# Patient Record
Sex: MALE | NOT HISPANIC OR LATINO | Employment: FULL TIME | URBAN - METROPOLITAN AREA
[De-identification: names, ages, dates, MRNs, and addresses within clinical notes are randomized per-mention and may not be internally consistent; named-entity substitution may affect disease eponyms.]

---

## 2017-01-31 ENCOUNTER — TELEPHONE (OUTPATIENT)
Dept: FAMILY MEDICINE | Facility: CLINIC | Age: 52
End: 2017-01-31

## 2017-02-06 ENCOUNTER — OFFICE VISIT (OUTPATIENT)
Dept: FAMILY MEDICINE | Facility: CLINIC | Age: 52
End: 2017-02-06

## 2017-02-06 VITALS
TEMPERATURE: 97.9 F | BODY MASS INDEX: 26.31 KG/M2 | HEART RATE: 67 BPM | DIASTOLIC BLOOD PRESSURE: 65 MMHG | WEIGHT: 173.6 LBS | SYSTOLIC BLOOD PRESSURE: 107 MMHG | HEIGHT: 68 IN

## 2017-02-06 DIAGNOSIS — Z23 NEED FOR VACCINATION: ICD-10-CM

## 2017-02-06 DIAGNOSIS — Z71.84 TRAVEL ADVICE ENCOUNTER: Primary | ICD-10-CM

## 2017-02-06 RX ORDER — LOPERAMIDE HYDROCHLORIDE 2 MG/1
TABLET ORAL
Qty: 30 TABLET | Refills: 0 | Status: SHIPPED | OUTPATIENT
Start: 2017-02-06 | End: 2018-10-24

## 2017-02-06 RX ORDER — ATOVAQUONE AND PROGUANIL HYDROCHLORIDE 250; 100 MG/1; MG/1
1 TABLET, FILM COATED ORAL DAILY
Qty: 26 TABLET | Refills: 0 | Status: SHIPPED | OUTPATIENT
Start: 2017-02-06 | End: 2018-10-24

## 2017-02-06 RX ORDER — AZITHROMYCIN 250 MG/1
500 TABLET, FILM COATED ORAL DAILY
Qty: 6 TABLET | Refills: 0 | Status: SHIPPED | OUTPATIENT
Start: 2017-02-06 | End: 2017-02-09

## 2017-02-06 NOTE — PROGRESS NOTES
[facility] Travel Visit         Jack Mello is a 52 year old male who presents for a pre-travel assessment visit.  He will be traveling to:    Destination(s):  SUBJECTIVE:  Patient is traveling to [place] and [facility].  He'll be leaving on 01/25/17 and returning on 03/11/17.   Patient will be visiting rural areas and he'll be staying and eating with local families.  Those families have farms, so he'll be directly in contact with some animals.     Patient wasn't born and raised in the [place], but he attended college in [place] He believes that when he went to the [facility], all of his immunizations were up-to-date (1999).  He doesn't remember the dates of the immunizations, but he believes that he got at least one MMR at that time.  Otherwise, he isn't sure.     The patient has never had bad reactions from immunizations.  He doesn't work or live by anyone who has immune deficiency.  No family history of such.  He hasn't had recent immunizations.  He has no known allergies.  He completed our general medical questionnaire with everything being negative, with the exception of some jaundice at birth.     His previous travel included [place], [place], [place], and [place].  He had an episode of traveler's diarrhea when in [place], but no other travel-related illnesses.  He hasn't had reactions to travel medications or immunizations previously.  He doesn't smoke or drink alcohol.  He doesn't use IV drugs.  He doesn't have evacuation insurance and the importance of obtaining this was discussed with him and his wife. We also discussed the acquisition of multidrug resistant bacteria with [place] to [place], traveler's diarrhea, and antibiotic treatment for TD        Jack Mello has completed the travel questionnaire and it is reviewed: YES  Are there special circumstances which place this traveler at higher risk (List if 'yes')?: NO       Past Medical History   Diagnosis Date      Anxiety 2009     associated with mom's passing away         Current Outpatient Prescriptions on File Prior to Visit:  Carboxymeth-Glycerin-Polysorb 0.5-1-0.5 % SOLN Apply 1 drop to eye as needed   Ascorbic Acid (VITAMIN C PO) Take 500 mg by mouth daily   multivitamin, therapeutic with minerals (MULTI-VITAMIN) TABS Take 1 tablet by mouth daily   cetirizine (ZYRTEC) 10 MG tablet Take 10 mg by mouth daily   diltiazem 2% in PLO cream, FV COMPOUNDED, 2% GEL To anal opening three times daily.  Use a pea-sized amount.  Store at room temperature.     No current facility-administered medications on file prior to visit.      No Known Allergies    Immunizations, travel specific:  -- Yellow fever: Not Required, Not Recommended  -- Hep A: Immunity status unknown  -- Hep B: Immunity status unknown  -- Typhoid (IM: booster every 2 years; PO: booster every 5 years): Immunity status unknown  -- Rabies  (Data on the need for and timing of additional booster doses are not available): Immunity status unknown  -- Meningococcal meningitis Immunity status unknown   -- Arabic encephalitis (Data on the need for and timing of additional booster doses are not available):Immunity status unknown    Immunizations, routine adult:  -- Influenza UTD with immunization   -- Polio: Immunity status unknown  -- Diphtheria, Tetanus & Pertussis (DTaP): Immunity status unknown  -- Measles/ Mumps/ Rubella: Immunity status unknown  -- Varicella: Patient is quite sure that he has had the disease.  His brother has also had shingles  -- Pneumococcal (PPSV23 (Pneumovax)): Immunity status unknown  -- Pneumococcal (PCV13 (Prevnar)): Immunity status unknown    Malaria prophylaxis:  Recommended (refer to [facility] for destination-specific recommendation) YES       Review of Systems:     C: NEGATIVE for fever, chills, change in weight  E/M: NEGATIVE for ear, mouth and throat problems  R: NEGATIVE for significant cough or SOB  CV: NEGATIVE for chest pain, palpitations  "or peripheral edema          Objective:     /65 mmHg  Pulse 67  Temp(Src) 97.9  F (36.6  C) (Oral)  Ht 5' 8\" (172.7 cm)  Wt 173 lb 9.6 oz (78.744 kg)  BMI 26.40 kg/m2  Body mass index is 26.4 kg/(m^2).    GENERAL: healthy, alert, well nourished, well hydrated, no distress  PSYCH: Alert and oriented times 3; speech- coherent , normal rate and volume; able to articulate logical thoughts, able to abstract reason, no tangential thoughts, affect- normal         Assessment and Plan     Based on patient's past history, review of immunization and immunity status, planned itinerary and current recommendations, the following are recommended:      Travel advice encounter  -     loperamide (IMODIUM A-D) 2 MG tablet; Take 2 tabs (4 mg) after first loose stool, and then take one tab (2 mg) after each diarrheal stool.  Max of 8 tabs (16 mg) per day.  -     atovaquone-proguanil (MALARONE) 250-100 MG per tablet; Take 1 tablet by mouth daily Start 2 days before travel and continue 7 days after return.  -     azithromycin (ZITHROMAX) 250 MG tablet; Take 2 tablets (500 mg) by mouth daily for 3 days  -     ADMIN VACCINE, EACH ADDITIONAL  -     ADMIN VACCINE, INITIAL  -     MMR VIRUS IMMUNIZATION, SUBCUT  -     HEPATITIS A VACCINE ADULT IM  -     TYPHOID VACCINE, IM  -     TDAP (BOOSTRIX AGES 10 and older)    Need for vaccination  -     ADMIN VACCINE, EACH ADDITIONAL  -     ADMIN VACCINE, INITIAL  -     MMR VIRUS IMMUNIZATION, SUBCUT  -     HEPATITIS A VACCINE ADULT IM  -     TYPHOID VACCINE, IM  -     TDAP (BOOSTRIX AGES 10 and older)  -     ADMIN VACCINE, EACH ADDITIONAL  -     ADMIN VACCINE, INITIAL  -     MMR VIRUS IMMUNIZATION, SUBCUT  -     HEPATITIS A VACCINE ADULT IM  -     TYPHOID VACCINE, IM  -     TDAP (BOOSTRIX AGES 10 and older)    Patient is given a copy of the [facility] traveller report as well as destination-specific recommendations on sun protection, avoiding insect bites and travel safety.    Options for " treatment and/or follow-up care were reviewed with the patient. Jack Mello was engaged and actively involved in the decision making process. He verbalized understanding of the options discussed and was satisfied with the final plan.      Herman Blood MD

## 2018-03-24 ENCOUNTER — TRANSFERRED RECORDS (OUTPATIENT)
Dept: HEALTH INFORMATION MANAGEMENT | Facility: CLINIC | Age: 53
End: 2018-03-24

## 2018-09-11 ENCOUNTER — OFFICE VISIT (OUTPATIENT)
Dept: OPHTHALMOLOGY | Facility: CLINIC | Age: 53
End: 2018-09-11
Attending: OPHTHALMOLOGY
Payer: COMMERCIAL

## 2018-09-11 DIAGNOSIS — Z98.890 HISTORY OF PHOTOREFRACTIVE KERATECTOMY: Primary | ICD-10-CM

## 2018-09-11 DIAGNOSIS — H52.222 REGULAR ASTIGMATISM OF LEFT EYE: ICD-10-CM

## 2018-09-11 PROCEDURE — G0463 HOSPITAL OUTPT CLINIC VISIT: HCPCS | Mod: 25,ZF

## 2018-09-11 PROCEDURE — 92015 DETERMINE REFRACTIVE STATE: CPT | Mod: 52,ZF

## 2018-09-11 ASSESSMENT — CUP TO DISC RATIO
OD_RATIO: 0.2
OS_RATIO: 0.2

## 2018-09-11 ASSESSMENT — EXTERNAL EXAM - RIGHT EYE: OD_EXAM: NORMAL

## 2018-09-11 ASSESSMENT — VISUAL ACUITY
OD_SC: 20/15
OD_SC+: -1
OD_SC: J3
OS_SC: J3
OS_SC: 20/25
OS_SC+: -2
METHOD: SNELLEN - LINEAR

## 2018-09-11 ASSESSMENT — TONOMETRY
IOP_METHOD: TONOPEN
OS_IOP_MMHG: 14
OD_IOP_MMHG: 12

## 2018-09-11 ASSESSMENT — CONF VISUAL FIELD
METHOD: COUNTING FINGERS
OD_NORMAL: 1
OS_NORMAL: 1

## 2018-09-11 ASSESSMENT — REFRACTION_MANIFEST
OD_CYLINDER: SPHERE
OD_ADD: +2.25
OS_CYLINDER: +1.50
OS_SPHERE: PLANO
OD_SPHERE: PLANO
OS_ADD: +2.25
OS_AXIS: 091

## 2018-09-11 ASSESSMENT — EXTERNAL EXAM - LEFT EYE: OS_EXAM: NORMAL

## 2018-09-11 ASSESSMENT — SLIT LAMP EXAM - LIDS
COMMENTS: NORMAL
COMMENTS: NORMAL

## 2018-09-11 NOTE — MR AVS SNAPSHOT
After Visit Summary   9/11/2018    Jack Mello    MRN: [id number]           Patient Information     Date Of Birth          1965        Visit Information        Provider Department      9/11/2018 2:30 PM Luis Daniel Walton MD [facility]        Today's Diagnoses     History of photorefractive keratectomy    -  1    Regular astigmatism of left eye           Follow-ups after your visit        Your next 10 appointments already scheduled     Oct 24, 2018  1:10 PM CDT   (Arrive by 12:55 PM)   New Patient Visit with Edi Sumner MD   [facility] ([facility])    [address] [place] [address]  4th [facility] [place] [address] [phone]              Who to contact     Please call your clinic at [phone] to:    Ask questions about your health    Make or cancel appointments    Discuss your medicines    Learn about your test results    Speak to your doctor            Additional Information About Your Visit        MyChart Information     [employer] gives you secure access to your electronic health record. If you see a primary care provider, you can also send messages to your care team and make appointments. If you have questions, please call your primary care clinic.  If you do not have a primary care provider, please call [phone] and they will assist you.      [employer] is an electronic gateway that provides easy, online access to your medical records. With [employer], you can request a clinic appointment, read your test results, renew a prescription or communicate with your care team.     To access your existing account, please contact your [facility] or call [phone] for assistance.        Care EveryWhere ID     This is your Care EveryWhere ID. This could be used by other organizations to access your [place] medical records  [id number]         Blood Pressure from Last 3 Encounters:   02/06/17 107/65   11/03/15 110/71    05/11/15 120/86    Weight from Last 3 Encounters:   02/06/17 78.7 kg (173 lb 9.6 oz)   11/03/15 79.5 kg (175 lb 3.2 oz)   05/11/15 80.3 kg (177 lb)              Today, you had the following     No orders found for display       Primary Care Provider Office Phone # Fax #    Edi Sumner [phone]       [facility] [address] [address]        Equal Access to Services     LATOYA [facility] Center[facility]ALGORZATA : Hadii aad ku hadasho Soomaali, waaxda luqadaha, qaybta kaalmada adeegyada, waxay idiin hayaan adelorin persaud . So [facility] [phone].    ATENCIÓN: Si habla español, tiene a farfan disposición servicios gratuitos de asistencia lingüística. [facility] [phone].    We comply with applicable federal civil rights laws and [place] laws. We do not discriminate on the basis of race, color, national origin, age, disability, sex, sexual orientation, or gender identity.            Thank you!     Thank you for choosing EYE CLINIC  for your care. Our goal is always to provide you with excellent care. Hearing back from our patients is one way we can continue to improve our services. Please take a few minutes to complete the written survey that you may receive in the mail after your visit with us. Thank you!             Your Updated Medication List - Protect others around you: Learn how to safely use, store and throw away your medicines at www.disposemymeds.org.          This list is accurate as of 9/11/18  4:17 PM.  Always use your most recent med list.                   Brand Name Dispense Instructions for use Diagnosis    atovaquone-proguanil 250-100 MG per tablet    MALARONE    26 tablet    Take 1 tablet by mouth daily Start 2 days before travel and continue 7 days after return.    Travel advice encounter       Carboxymeth-Glycerin-Polysorb 0.5-1-0.5 % Soln      Apply 1 drop to eye as needed        cetirizine 10 MG tablet    zyrTEC     Take 10 mg by mouth daily        diltiazem 2% in PLO cream (FV  COMPOUNDED) 2% Gel     60 g    To anal opening three times daily.  Use a pea-sized amount.  Store at room temperature.    Anal fissure       loperamide 2 MG tablet    IMODIUM A-D    30 tablet    Take 2 tabs (4 mg) after first loose stool, and then take one tab (2 mg) after each diarrheal stool.  Max of 8 tabs (16 mg) per day.    Travel advice encounter       Multi-vitamin Tabs tablet      Take 1 tablet by mouth daily        VITAMIN C PO      Take 500 mg by mouth daily

## 2018-09-11 NOTE — NURSING NOTE
Chief Complaints and History of Present Illnesses   Patient presents with     Follow Up For     History of photorefractive keratectomy      HPI    Last Eye Exam:  7/26/16   Affected eye(s):  Both   Symptoms:        Unknown duration    Frequency:  Constant       Do you have eye pain now?:  No      Comments:  Jack is here for a follow up of History of photorefractive keratectomy   He says vision is pretty good, but says when he wakes; his eyes are dry.     Cristo Peña COT 2:40 PM September 11, 2018

## 2018-09-11 NOTE — PROGRESS NOTES
"HPI:  51 year old male here to discuss left eye laser touch up.  Has really noticed he has been \"leaning on the right eye\" as of late.  He notices problems with focusing and depth perception.  Since the surgery he has noticed that he has had worsening of his dry eye.  Uses artificial tears.    A/P:  S/p Photorefractive keratectomy (PRK) both eyes  12/14/15   Stabilized plano right eye    Residual cylinder 1.5d left eye   Stable x 2 years    Plan enhancement Photorefractive keratectomy (PRK) w mitomycin-C left eye     Luis Downs M.D.  PGY-3, Ophthalmology       ~~~~~~~~~~~~~~~~~~~~~~~~~~~~~~~~~~~~~~~~~~~~~~~~~~~~~~~~~~~~~~~~    Complete documentation of historical and exam elements from today's encounter can be found in the full encounter summary report (not reduplicated in this progress note). I personally obtained the chief complaint(s) and history of present illness.  I confirmed and edited as necessary the review of systems, past medical/surgical history, family history, social history, and examination findings as documented by others.  I examined the patient myself, and I personally reviewed the relevant tests, images, and reports as documented above. I formulated and edited as necessary the assessment and plan and discussed the findings and management plan with the patient and family.     Luis Daniel Walton MD, MA  Director, Cornea & Anterior Segment  [facility] Department of Ophthalmology & Visual Neuroscience        "

## 2018-09-11 NOTE — Clinical Note
Patient will need Photorefractive keratectomy (PRK) enhancement left eye only at [facility] once contract up and running.  Please call him to schedule once we are able.  He's hoping to get this done before winter.

## 2018-10-24 ENCOUNTER — OFFICE VISIT (OUTPATIENT)
Dept: INTERNAL MEDICINE | Facility: CLINIC | Age: 53
End: 2018-10-24
Payer: COMMERCIAL

## 2018-10-24 ENCOUNTER — APPOINTMENT (OUTPATIENT)
Dept: LAB | Facility: CLINIC | Age: 53
End: 2018-10-24
Payer: COMMERCIAL

## 2018-10-24 VITALS
DIASTOLIC BLOOD PRESSURE: 78 MMHG | SYSTOLIC BLOOD PRESSURE: 121 MMHG | HEART RATE: 76 BPM | WEIGHT: 179 LBS | BODY MASS INDEX: 27.22 KG/M2

## 2018-10-24 DIAGNOSIS — Z00.00 ENCOUNTER FOR ROUTINE HISTORY AND PHYSICAL EXAM FOR MALE: ICD-10-CM

## 2018-10-24 DIAGNOSIS — Z23 NEED FOR PROPHYLACTIC VACCINATION AND INOCULATION AGAINST INFLUENZA: Primary | ICD-10-CM

## 2018-10-24 DIAGNOSIS — R74.8 ELEVATED LIVER ENZYMES: ICD-10-CM

## 2018-10-24 LAB
ALBUMIN SERPL-MCNC: 4.1 G/DL (ref 3.4–5)
ALBUMIN UR-MCNC: NEGATIVE MG/DL
ALP SERPL-CCNC: 87 U/L (ref 40–150)
ALT SERPL W P-5'-P-CCNC: 145 U/L (ref 0–70)
ANION GAP SERPL CALCULATED.3IONS-SCNC: 6 MMOL/L (ref 3–14)
APPEARANCE UR: CLEAR
AST SERPL W P-5'-P-CCNC: 74 U/L (ref 0–45)
BILIRUB SERPL-MCNC: 0.8 MG/DL (ref 0.2–1.3)
BILIRUB UR QL STRIP: NEGATIVE
BUN SERPL-MCNC: 13 MG/DL (ref 7–30)
CALCIUM SERPL-MCNC: 8.7 MG/DL (ref 8.5–10.1)
CHLORIDE SERPL-SCNC: 102 MMOL/L (ref 94–109)
CHOLEST SERPL-MCNC: 220 MG/DL
CO2 SERPL-SCNC: 27 MMOL/L (ref 20–32)
COLOR UR AUTO: YELLOW
CREAT SERPL-MCNC: 0.96 MG/DL (ref 0.66–1.25)
GFR SERPL CREATININE-BSD FRML MDRD: 82 ML/MIN/1.7M2
GLUCOSE SERPL-MCNC: 87 MG/DL (ref 70–99)
GLUCOSE UR STRIP-MCNC: NEGATIVE MG/DL
HDLC SERPL-MCNC: 34 MG/DL
HGB UR QL STRIP: NEGATIVE
KETONES UR STRIP-MCNC: NEGATIVE MG/DL
LDLC SERPL CALC-MCNC: 152 MG/DL
LEUKOCYTE ESTERASE UR QL STRIP: NEGATIVE
MUCOUS THREADS #/AREA URNS LPF: PRESENT /LPF
NITRATE UR QL: NEGATIVE
NONHDLC SERPL-MCNC: 186 MG/DL
PH UR STRIP: 6 PH (ref 5–7)
POTASSIUM SERPL-SCNC: 3.6 MMOL/L (ref 3.4–5.3)
PROT SERPL-MCNC: 8.2 G/DL (ref 6.8–8.8)
PSA SERPL-ACNC: 0.97 UG/L (ref 0–4)
RBC #/AREA URNS AUTO: 0 /HPF (ref 0–2)
SODIUM SERPL-SCNC: 135 MMOL/L (ref 133–144)
SOURCE: ABNORMAL
SP GR UR STRIP: 1.02 (ref 1–1.03)
TRIGL SERPL-MCNC: 168 MG/DL
TSH SERPL DL<=0.005 MIU/L-ACNC: 1.7 MU/L (ref 0.4–4)
UROBILINOGEN UR STRIP-MCNC: 0 MG/DL (ref 0–2)
WBC #/AREA URNS AUTO: 1 /HPF (ref 0–5)

## 2018-10-24 ASSESSMENT — PAIN SCALES - GENERAL: PAINLEVEL: NO PAIN (0)

## 2018-10-24 NOTE — PROGRESS NOTES
[facility]  Primary Care Center   Edi Sumner MD  10/24/2018      Chief Complaint:   Establish Care and Physical       History of Present Illness:   Jack Mello is a 53 year old male with a history of anxiety who presents alone for establishing care and a physical evaluation.    Weight: He notes he has been struggling to lose 5-10 lbs. However, he has been maintaining his weight between 168-174lbs. He notes he would like to be below 170 lbs. He previously attended yoga classes but is no longer doing this.    Cholesterol: His cholesterol was last checked four years ago on 11/6/2014 which was borderline elevated. His HDL was low and LDL was elevated at that time as well. He would like to have this rechecked today.      Anxiety: He suffered from anxiety after his mother passed away. He has a strained relationship with one of his sisters but is close with his other siblings. He recently took a family trip back to [facility] which he believed helped reduce some of his anxiety.     Congestion: He has occasional sinus congestion which he has dealt with his whole life. To resolve this he uses nasal irrigation. He notes that sometimes he has migraine symptoms associated with this congestion. In order to and will sometimes take Ibuprofen or Excedrin. His migraine symptoms are resolved within a few hours after he spends some time in a dark room.     Pain: He notes that his neck will get tight on both sides from time to time which he believes could be due to stress. He notes that he sometimes feels the tightness in his lower back as well. Of note, he has not seen massage therapist. He denies hematuria and pain with urination.      Other concerns discussed:  1. Colonoscopy completed   2. Flu vaccination  3. PSA check - strong stream, no issues   4. Moles have been checked and no changes      Review of Systems:   Pertinent items are noted in HPI, remainder of complete ROS is negative.      Active Medications:      Ascorbic Acid  (VITAMIN C PO), Take 500 mg by mouth daily, Disp: , Rfl:      atovaquone-proguanil (MALARONE) 250-100 MG per tablet, Take 1 tablet by mouth daily Start 2 days before travel and continue 7 days after return., Disp: 26 tablet, Rfl: 0     Carboxymeth-Glycerin-Polysorb 0.5-1-0.5 % SOLN, Apply 1 drop to eye as needed, Disp: , Rfl:      cetirizine (ZYRTEC) 10 MG tablet, Take 10 mg by mouth daily, Disp: , Rfl:      diltiazem 2% in PLO cream, FV COMPOUNDED, 2% GEL, To anal opening three times daily.  Use a pea-sized amount.  Store at room temperature., Disp: 60 g, Rfl: 0     loperamide (IMODIUM A-D) 2 MG tablet, Take 2 tabs (4 mg) after first loose stool, and then take one tab (2 mg) after each diarrheal stool.  Max of 8 tabs (16 mg) per day., Disp: 30 tablet, Rfl: 0     multivitamin, therapeutic with minerals (MULTI-VITAMIN) TABS, Take 1 tablet by mouth daily, Disp: , Rfl:       Allergies:   Review of patient's allergies indicates no known allergies.      Past Medical History:  Anxiety  Myopia      Past Surgical History:  Photorefractive keratectomy unilateral standard or customvue w or w/o mitomycin, right/left - 12/14/2015  [place] screening, bilateral - 12/14/2015    Family History:   Thyroid cancer - father  Coronary artery disease - mother      Social History:   The patient is  with a 9 year old son, a nonsmoker, and rarely consumes alcohol. 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      Physical Exam:   /78 (BP Location: Right arm, Patient Position: Sitting, Cuff Size: Adult Large)  Pulse 76  Wt 81.2 kg (179 lb)  BMI 27.22 kg/m2   GENERAL APPEARANCE: Healthy, alert and no distress  EYES: EOMI, PERRL  HENT: Ear canals and TM's normal. Nose and mouth without ulcers or lesions  NECK: No adenopathy, no asymmetry, masses, or scars. Thyroid normal to palpation  RESP: Lungs clear to auscultation - no rales, rhonchi or wheezes  CV: Regular rates and rhythm, normal  S1 and S2, no S3 or S4 and no murmur, click or rub  ABDOMEN:  Soft, nontender, no HSM or masses and bowel sounds normal  MS: Extremities normal - no gross deformities noted  NEURO: Normal strength and tone, mentation intact and speech normal  PSYCH: Mentation appears normal and affect normal/bright   GENITAL: normal; both testes normal without tenderness or masses, no inguinal hernia   SKIN: scattered benign  nevi on back.      Assessment and Plan:  Need for prophylactic vaccination and inoculation against influenza  - FLU VACCINE, SPLIT VIRUS, IM (QUADRIVALENT) [71642]- >3 YRS    Encounter for routine history and physical exam for male  Routine blood work due. Pending results will follow up as needed.   - Lipid Profile FASTING  - Comprehensive metabolic panel  - PSA screen  - TSH with free T4 reflex  - UA with Micro reflex to Culture     Weight  Weight is well maintained. Continue with current activity level. Advised to continue to try to lower weight set point down to 175 lbs by controlling amount and frequency of eating. Routine exercising could help with basal metabolic rate.      Hyperlipidemia   Would like to address lowering cholesterol through increased exercising and managing diet. Also advised him to continue spending time with his family and sustaining those relationships to help reduce depression and thus risk of heart attack. The ASCVD 10 year risk for heart attack is 5.7 %, would be reduced to 4.3% with statin therapy.  We discussed this and for now, he prefers to trial diet and exercise.      Asprin   Consider baby Asprin once a day to help with platelets.Could also reduce risk of colorectal cancer.       Follow-up: Data Unavailable Patient should follow up in clinic in one year or on an as needed basis.        Scribe Disclosure:   I, Graciela Kirkpatrick, am serving as a scribe to document services personally performed by Edi Sumner MD at this visit, based upon the provider's statements to me. All  documentation has been reviewed by the aforementioned provider prior to being entered into the official medical record.     Portions of this medical record were completed by a scribe. UPON MY REVIEW AND AUTHENTICATION BY ELECTRONIC SIGNATURE, this confirms (a) I performed the applicable clinical services, and (b) the record is accurate.

## 2018-10-24 NOTE — MR AVS SNAPSHOT
After Visit Summary   10/24/2018    Jack Mello    MRN: [id number]           Patient Information     Date Of Birth          1965        Visit Information        Provider Department      10/24/2018 1:10 PM Edi Sumner MD [facility]        Today's Diagnoses     Need for prophylactic vaccination and inoculation against influenza    -  1    Encounter for routine history and physical exam for male          Care Instructions    Primary Care Center Medication Refill Request Information:  * Please contact your pharmacy regarding ANY request for medication refills.  ** Psychiatric Prescription Fax = [phone]  * Please allow 3 business days for routine medication refills.  * Please allow 5 business days for controlled substance medication refills.     Primary Care Center Test Result notification information:  *You will be notified with in 7-10 days of your appointment day regarding the results of your test.  If you are on MyChart you will be notified as soon as the provider has reviewed the results and signed off on them.    [facility]: [phone]             Follow-ups after your visit        Your next 10 appointments already scheduled     Oct 24, 2018  2:15 PM CDT   LAB with  LAB   [facility] ([facility])    [address] [place] [id number] [phone]           Please do not eat 10-12 hours before your appointment if you are coming in fasting for labs on lipids, cholesterol, or glucose (sugar). This does not apply to pregnant women. Water, hot tea and black coffee (with nothing added) are okay. Do not drink other fluids, diet soda or chew gum.              Who to contact     Please call your clinic at [phone] to:    Ask questions about your health    Make or cancel appointments    Discuss your medicines    Learn about your test results    Speak to your doctor            Additional Information About Your  Visit        [employer] Information     [employer] gives you secure access to your electronic health record. If you see a primary care provider, you can also send messages to your care team and make appointments. If you have questions, please call your primary care clinic.  If you do not have a primary care provider, please call [phone] and they will assist you.      [employer] is an electronic gateway that provides easy, online access to your medical records. With [employer], you can request a clinic appointment, read your test results, renew a prescription or communicate with your care team.     To access your existing account, please contact your [facility] or call [phone] for assistance.        Care EveryWhere ID     This is your Care EveryWhere ID. This could be used by other organizations to access your [place] medical records  [id number]        Your Vitals Were     Pulse BMI (Body Mass Index)                76 27.22 kg/m2           Blood Pressure from Last 3 Encounters:   10/24/18 121/78   02/06/17 107/65   11/03/15 110/71    Weight from Last 3 Encounters:   10/24/18 81.2 kg (179 lb)   02/06/17 78.7 kg (173 lb 9.6 oz)   11/03/15 79.5 kg (175 lb 3.2 oz)              We Performed the Following     Comprehensive metabolic panel     FLU VACCINE, SPLIT VIRUS, IM (QUADRIVALENT) [68965]- >3 YRS     Lipid Profile FASTING     PSA screen     TSH with free T4 reflex     UA with Micro reflex to Culture          Today's Medication Changes          These changes are accurate as of 10/24/18  2:03 PM.  If you have any questions, ask your nurse or doctor.               Stop taking these medicines if you haven't already. Please contact your care team if you have questions.     atovaquone-proguanil 250-100 MG per tablet   Commonly known as:  MALARONE   Stopped by:  Edi Sumner MD           Carboxymeth-Glycerin-Polysorb 0.5-1-0.5 % Soln   Stopped by:  Edi Sumner MD            cetirizine 10 MG tablet   Commonly known as:  zyrTEC   Stopped by:  Edi Sumner MD           diltiazem 2% in PLO cream (FV COMPOUNDED) 2% Gel   Stopped by:  Edi Sumner MD           loperamide 2 MG tablet   Commonly known as:  IMODIUM A-D   Stopped by:  Edi Sumner MD           VITAMIN C PO   Stopped by:  Edi Sumner MD                    Primary Care Provider Office Phone # Fax #    Edi Sunmer [phone]       [facility] [facility] [address]  [facility] [address]        Equal Access to Services     [facility]: Hadii aad ku hadasho Soomaali, waaxda luqadaha, qaybta kaalmada adeegyada, vince camacho hayjose manueln adelorin persaud . So [facility] [phone].    ATENCIÓN: Si habla español, tiene a farfan disposición servicios gratuitos de asistencia lingüística. [facility] [phone].    We comply with applicable federal civil rights laws and [place] laws. We do not discriminate on the basis of race, color, national origin, age, disability, sex, sexual orientation, or gender identity.            Thank you!     Thank you for choosing [facility]  for your care. Our goal is always to provide you with excellent care. Hearing back from our patients is one way we can continue to improve our services. Please take a few minutes to complete the written survey that you may receive in the mail after your visit with us. Thank you!             Your Updated Medication List - Protect others around you: Learn how to safely use, store and throw away your medicines at www.disposemymeds.org.          This list is accurate as of 10/24/18  2:03 PM.  Always use your most recent med list.                   Brand Name Dispense Instructions for use Diagnosis    Multi-vitamin Tabs tablet      Take 1 tablet by mouth daily

## 2018-10-24 NOTE — NURSING NOTE
Jack Mello      1.  Has the patient received the information for the influenza vaccine? YES    2.  Does the patient have any of the following contraindications?     Allergy to eggs? No     Allergic reaction to previous influenza vaccines? No     Any other problems to previous influenza vaccines? No     Paralyzed by Guillain-Wheaton syndrome? No     Currently pregnant? NO     Current moderate or severe illness? No     Allergy to contact lens solution? No    3.  The vaccine has been administered in the usual fashion and the patient was instructed to wait 20 minutes before leaving the building in the event of an allergic reaction: YES    Recorded by Sandro Maynard

## 2018-10-24 NOTE — NURSING NOTE
Chief Complaint   Patient presents with     Establish Care     here to re-establis care     Physical     needs flu shot       Sandro Maynard, EMT 1:14 PM on 10/24/2018.

## 2018-10-24 NOTE — PATIENT INSTRUCTIONS
[facility] Medication Refill Request Information:  * Please contact your pharmacy regarding ANY request for medication refills.  ** [facility] Prescription Fax = [phone]  * Please allow 3 business days for routine medication refills.  * Please allow 5 business days for controlled substance medication refills.     [facility] Test Result notification information:  *You will be notified with in 7-10 days of your appointment day regarding the results of your test.  If you are on MyChart you will be notified as soon as the provider has reviewed the results and signed off on them.    [facility]: [phone]

## 2018-12-16 ENCOUNTER — TRANSFERRED RECORDS (OUTPATIENT)
Dept: HEALTH INFORMATION MANAGEMENT | Facility: CLINIC | Age: 53
End: 2018-12-16

## 2019-01-14 ENCOUNTER — TELEPHONE (OUTPATIENT)
Dept: OPHTHALMOLOGY | Facility: CLINIC | Age: 54
End: 2019-01-14

## 2019-01-14 DIAGNOSIS — Z98.890 HISTORY OF PHOTOREFRACTIVE KERATECTOMY: Primary | ICD-10-CM

## 2019-01-14 NOTE — TELEPHONE ENCOUNTER
Returned patient call to schedule procedure with Dr. Walton. Advised I will need to get orders and call him back.

## 2019-01-17 ENCOUNTER — TELEPHONE (OUTPATIENT)
Dept: OPHTHALMOLOGY | Facility: CLINIC | Age: 54
End: 2019-01-17

## 2019-01-17 NOTE — TELEPHONE ENCOUNTER
Called patient to schedule procedure with Dr. Walton, there was no answer. Left message with my direct line [phone].

## 2019-01-22 ENCOUNTER — TELEPHONE (OUTPATIENT)
Dept: OPHTHALMOLOGY | Facility: CLINIC | Age: 54
End: 2019-01-22

## 2019-01-22 NOTE — TELEPHONE ENCOUNTER
Called patient to schedule procedure with Dr. Walton, there was no answer. Left message with my direct line [phone].

## 2019-01-29 ENCOUNTER — TELEPHONE (OUTPATIENT)
Dept: OPHTHALMOLOGY | Facility: CLINIC | Age: 54
End: 2019-01-29

## 2019-01-29 NOTE — TELEPHONE ENCOUNTER
Patient is scheduled for surgery with Dr. Walton      Spoke or left message with: patient    Date of Surgery: 2/6/19    Location: [facility]    Informed patient they will need an adult  yes    Pre-op with surgeon (if applicable): JONATAN    H&P: Scheduled with JONATAN topical    Additional imaging/appointments: JONATAN    Surgery packet: mailed 1/29/19    Additional comments: Advised need adult surgery day and 1 day post op. Advised financial counselor will call with pricing for procedure.

## 2019-01-29 NOTE — TELEPHONE ENCOUNTER
Called patient to schedule procedure with Dr. Walton, there was no answer. Left message with my direct line [phone].

## 2019-02-04 ENCOUNTER — TELEPHONE (OUTPATIENT)
Dept: OPHTHALMOLOGY | Facility: CLINIC | Age: 54
End: 2019-02-04

## 2019-02-04 NOTE — TELEPHONE ENCOUNTER
Returned patient's call to verify arrival time for procedure on 2/6/19 with Dr. Walton. Advised arrival time is 7: 15 am surgery time 8: 15 am.

## 2019-02-06 ENCOUNTER — TRANSFERRED RECORDS (OUTPATIENT)
Dept: HEALTH INFORMATION MANAGEMENT | Facility: CLINIC | Age: 54
End: 2019-02-06

## 2019-02-07 ENCOUNTER — OFFICE VISIT (OUTPATIENT)
Dept: OPHTHALMOLOGY | Facility: CLINIC | Age: 54
End: 2019-02-07
Attending: OPHTHALMOLOGY
Payer: COMMERCIAL

## 2019-02-07 DIAGNOSIS — Z98.890 HISTORY OF PHOTOREFRACTIVE KERATECTOMY: Primary | ICD-10-CM

## 2019-02-07 PROCEDURE — G0463 HOSPITAL OUTPT CLINIC VISIT: HCPCS | Mod: ZF

## 2019-02-07 ASSESSMENT — TONOMETRY
IOP_METHOD: ICARE
OS_IOP_MMHG: 13
OD_IOP_MMHG: 13

## 2019-02-07 ASSESSMENT — VISUAL ACUITY
OS_SC: 20/200
OS_PH_SC: 20/40
OD_SC: 20/15
METHOD: SNELLEN - LINEAR

## 2019-02-07 ASSESSMENT — EXTERNAL EXAM - LEFT EYE: OS_EXAM: NORMAL

## 2019-02-07 ASSESSMENT — EXTERNAL EXAM - RIGHT EYE: OD_EXAM: NORMAL

## 2019-02-07 ASSESSMENT — SLIT LAMP EXAM - LIDS
COMMENTS: NORMAL
COMMENTS: NORMAL

## 2019-02-07 NOTE — PROGRESS NOTES
"CC: POD1 s/p PRK with MMC OS    HPI:  51 year old male here to discuss left eye laser touch up.  Has really noticed he has been \"leaning on the right eye\" as of late.  He notices problems with focusing and depth perception.  Since the surgery he has noticed that he has had worsening of his dry eye.  Uses artificial tears.     POH:  -s/p PRK each eye 12/14/15  -s/p PRK with [facility] enhancement left eye 2/6/19    Gtts:  Ofloxacin QID left eye  pred QID left eye  PF artificial tears q1 while awake OS    A/P:  1. S/p Photorefractive keratectomy (PRK) both eyes  12/14/15   -right eye doing well - 20/15 uncorrected  -left eye: POD1 s/p PRK enhancement +[facility], left eye 2/6/19  -continue pred QID, ketorolac QID, and ofloxacin QID  -frequent preservative-free tears every 1 hour  -no eye rubbing, no water in the eye; shield at bedtime    2. Nuclear sclerosis, each eye  -minimally visually significant  -monitor    Hold MRx until heals from PRK left eye.  F/u 1 week    Krupa Valerio MD  PGY-5, Cornea Fellow  Ophthalmology        ~~~~~~~~~~~~~~~~~~~~~~~~~~~~~~~~~~~~~~~~~~~~~~~~~~~~~~~~~~~~~~~~    Complete documentation of historical and exam elements from today's encounter can be found in the full encounter summary report (not reduplicated in this progress note). I personally obtained the chief complaint(s) and history of present illness.  I confirmed and edited as necessary the review of systems, past medical/surgical history, family history, social history, and examination findings as documented by others.  I examined the patient myself, and I personally reviewed the relevant tests, images, and reports as documented above. I formulated and edited as necessary the assessment and plan and discussed the findings and management plan with the patient and family.     Luis Daniel Walton MD, MA  Director, Cornea & Anterior Segment  [facility] Department of Ophthalmology & Visual Neuroscience        "

## 2019-02-07 NOTE — NURSING NOTE
Chief Complaints and History of Present Illnesses   Patient presents with     Post Op (Ophthalmology) Left Eye     Chief Complaint(s) and History of Present Illness(es)     Post Op (Ophthalmology) Left Eye     Laterality: left eye    Onset: sudden    Onset: days ago    Quality: blurred vision    Frequency: constantly    Associated symptoms: dryness.  Negative for headache    Treatments tried: artificial tears    Pain scale: 0/10              Comments     Slept well  Sharla Guzmán COT 9:45 AM February 7, 2019

## 2019-02-15 ENCOUNTER — OFFICE VISIT (OUTPATIENT)
Dept: OPHTHALMOLOGY | Facility: CLINIC | Age: 54
End: 2019-02-15
Attending: OPHTHALMOLOGY
Payer: COMMERCIAL

## 2019-02-15 DIAGNOSIS — Z98.890 HISTORY OF PHOTOREFRACTIVE KERATECTOMY: Primary | ICD-10-CM

## 2019-02-15 PROCEDURE — G0463 HOSPITAL OUTPT CLINIC VISIT: HCPCS | Mod: ZF

## 2019-02-15 RX ORDER — PREDNISOLONE ACETATE 10 MG/ML
1-2 SUSPENSION/ DROPS OPHTHALMIC 3 TIMES DAILY
Qty: 5 ML | Refills: 0 | Status: SHIPPED | OUTPATIENT
Start: 2019-02-15 | End: 2022-05-05

## 2019-02-15 ASSESSMENT — VISUAL ACUITY
OS_SC+: -1
OS_SC: 20/40
METHOD: SNELLEN - LINEAR
OS_PH_SC+: +1
OS_PH_SC: 20/20
OD_SC: 20/20

## 2019-02-15 ASSESSMENT — TONOMETRY
IOP_METHOD: TONOPEN
OS_IOP_MMHG: 13
OD_IOP_MMHG: 10

## 2019-02-15 ASSESSMENT — EXTERNAL EXAM - LEFT EYE: OS_EXAM: NORMAL

## 2019-02-15 ASSESSMENT — SLIT LAMP EXAM - LIDS
COMMENTS: NORMAL
COMMENTS: NORMAL

## 2019-02-15 ASSESSMENT — EXTERNAL EXAM - RIGHT EYE: OD_EXAM: NORMAL

## 2019-02-15 NOTE — NURSING NOTE
Patient presents for 1wk s/p PRK LE with [facility] left eye (2/6/19). Since the last visit the vision has improved a little LE. No pain or discomfort, no redness itching or tears. Some dryness. No f/f. ATS and rx eye drops QID, discntd Ketorolac. Intermittent use of padron eye shield at night. Ryanne Dobbins COT 10:12 AM February 15, 2019

## 2019-03-05 ENCOUNTER — TELEPHONE (OUTPATIENT)
Dept: OPHTHALMOLOGY | Facility: CLINIC | Age: 54
End: 2019-03-05

## 2019-03-11 NOTE — PROGRESS NOTES
1 week s/p Photorefractive keratectomy (PRK) enhancement left eye  CTL out  Central epi haze as expected  Taper gtts over 2 mo  Lubricate  Return to clinic 2 mo, manifest refraction      ~~~~~~~~~~~~~~~~~~~~~~~~~~~~~~~~~~~~~~~~~~~~~~~~~~~~~~~~~~~~~~~~    Complete documentation of historical and exam elements from today's encounter can be found in the full encounter summary report (not reduplicated in this progress note). I personally obtained the chief complaint(s) and history of present illness.  I confirmed and edited as necessary the review of systems, past medical/surgical history, family history, social history, and examination findings as documented by others.  I examined the patient myself, and I personally reviewed the relevant tests, images, and reports as documented above. I formulated and edited as necessary the assessment and plan and discussed the findings and management plan with the patient and family.     Luis Daniel Walton MD, MA  Director, Cornea & Anterior Segment  [facility] Department of Ophthalmology & Visual Neuroscience

## 2019-04-02 ENCOUNTER — OFFICE VISIT (OUTPATIENT)
Dept: OPHTHALMOLOGY | Facility: CLINIC | Age: 54
End: 2019-04-02
Attending: OPHTHALMOLOGY
Payer: COMMERCIAL

## 2019-04-02 DIAGNOSIS — Z98.890 HISTORY OF PHOTOREFRACTIVE KERATECTOMY: Primary | ICD-10-CM

## 2019-04-02 PROCEDURE — 92015 DETERMINE REFRACTIVE STATE: CPT | Mod: 52,ZF

## 2019-04-02 PROCEDURE — 40000269 ZZH STATISTIC NO CHARGE FACILITY FEE: Mod: ZF

## 2019-04-02 ASSESSMENT — SLIT LAMP EXAM - LIDS
COMMENTS: NORMAL
COMMENTS: NORMAL

## 2019-04-02 ASSESSMENT — VISUAL ACUITY
OD_SC: 20/15
METHOD: SNELLEN - LINEAR
OD_SC+: -1
OS_SC+: +1
OS_SC: 20/25

## 2019-04-02 ASSESSMENT — REFRACTION_MANIFEST
OS_SPHERE: -0.50
OS_CYLINDER: +0.50
OS_AXIS: 003

## 2019-04-02 ASSESSMENT — CONF VISUAL FIELD
OS_NORMAL: 1
METHOD: COUNTING FINGERS
OD_NORMAL: 1

## 2019-04-02 ASSESSMENT — TONOMETRY
OS_IOP_MMHG: 17
IOP_METHOD: TONOPEN
OD_IOP_MMHG: 17

## 2019-04-02 ASSESSMENT — EXTERNAL EXAM - LEFT EYE: OS_EXAM: NORMAL

## 2019-04-02 ASSESSMENT — EXTERNAL EXAM - RIGHT EYE: OD_EXAM: NORMAL

## 2019-04-02 NOTE — PROGRESS NOTES
2 mo s/p Photorefractive keratectomy (PRK) enhancement (mostly cylinder) left eye    Doing well  Healed  No haze  Finishing drop taper  negligible residual manifest refraction    Observe for now  Manifest refraction given for driving    Return to clinic as needed      ~~~~~~~~~~~~~~~~~~~~~~~~~~~~~~~~~~~~~~~~~~~~~~~~~~~~~~~~~~~~~~~~    Complete documentation of historical and exam elements from today's encounter can be found in the full encounter summary report (not reduplicated in this progress note). I personally obtained the chief complaint(s) and history of present illness.  I confirmed and edited as necessary the review of systems, past medical/surgical history, family history, social history, and examination findings as documented by others.  I examined the patient myself, and I personally reviewed the relevant tests, images, and reports as documented above. I formulated and edited as necessary the assessment and plan and discussed the findings and management plan with the patient and family.     Luis Daniel Walton MD, MA  Director, Cornea & Anterior Segment  [facility] Department of Ophthalmology & Visual Neuroscience

## 2019-04-02 NOTE — NURSING NOTE
Chief Complaints and History of Present Illnesses   Patient presents with     Post Op (Ophthalmology) Left Eye     Chief Complaint(s) and History of Present Illness(es)     Post Op (Ophthalmology) Left Eye     Laterality: left eye    Onset: gradual    Course: gradually improving              Comments     Pt feels vision is slowly improving. No pain.   Prednisolone every day left eye- will be done on Saturday      Moses Garcia COT 1:21 PM April 2, 2019

## 2020-02-24 ENCOUNTER — HEALTH MAINTENANCE LETTER (OUTPATIENT)
Age: 55
End: 2020-02-24

## 2020-12-06 ASSESSMENT — ENCOUNTER SYMPTOMS
FEVER: 0
INCREASED ENERGY: 0
DECREASED CONCENTRATION: 1
CHILLS: 0
NERVOUS/ANXIOUS: 1
HALLUCINATIONS: 0
EYE PAIN: 0
PANIC: 0
WEIGHT GAIN: 0
POLYDIPSIA: 0
ALTERED TEMPERATURE REGULATION: 0
EYE REDNESS: 0
NIGHT SWEATS: 0
EYE IRRITATION: 0
EYE WATERING: 0
WEIGHT LOSS: 0
DOUBLE VISION: 0
POLYPHAGIA: 0
INSOMNIA: 0
DECREASED APPETITE: 0
FATIGUE: 0
DEPRESSION: 1

## 2020-12-07 ENCOUNTER — OFFICE VISIT (OUTPATIENT)
Dept: INTERNAL MEDICINE | Facility: CLINIC | Age: 55
End: 2020-12-07
Payer: COMMERCIAL

## 2020-12-07 VITALS
OXYGEN SATURATION: 98 % | HEART RATE: 71 BPM | SYSTOLIC BLOOD PRESSURE: 123 MMHG | WEIGHT: 170 LBS | DIASTOLIC BLOOD PRESSURE: 83 MMHG | BODY MASS INDEX: 25.85 KG/M2

## 2020-12-07 DIAGNOSIS — E78.5 HYPERLIPIDEMIA LDL GOAL <130: ICD-10-CM

## 2020-12-07 DIAGNOSIS — Z00.00 ANNUAL PHYSICAL EXAM: ICD-10-CM

## 2020-12-07 DIAGNOSIS — Z00.00 ANNUAL PHYSICAL EXAM: Primary | ICD-10-CM

## 2020-12-07 LAB
ANION GAP SERPL CALCULATED.3IONS-SCNC: 6 MMOL/L (ref 3–14)
BUN SERPL-MCNC: 14 MG/DL (ref 7–30)
CALCIUM SERPL-MCNC: 9.4 MG/DL (ref 8.5–10.1)
CHLORIDE SERPL-SCNC: 105 MMOL/L (ref 94–109)
CHOLEST SERPL-MCNC: 229 MG/DL
CO2 SERPL-SCNC: 27 MMOL/L (ref 20–32)
CREAT SERPL-MCNC: 0.91 MG/DL (ref 0.66–1.25)
GFR SERPL CREATININE-BSD FRML MDRD: >90 ML/MIN/{1.73_M2}
GLUCOSE SERPL-MCNC: 88 MG/DL (ref 70–99)
HDLC SERPL-MCNC: 37 MG/DL
LDLC SERPL CALC-MCNC: 165 MG/DL
NONHDLC SERPL-MCNC: 192 MG/DL
POTASSIUM SERPL-SCNC: 3.9 MMOL/L (ref 3.4–5.3)
PSA SERPL-ACNC: 1.06 UG/L (ref 0–4)
SODIUM SERPL-SCNC: 138 MMOL/L (ref 133–144)
TRIGL SERPL-MCNC: 138 MG/DL

## 2020-12-07 PROCEDURE — G0103 PSA SCREENING: HCPCS | Performed by: PATHOLOGY

## 2020-12-07 PROCEDURE — 86803 HEPATITIS C AB TEST: CPT | Mod: 90 | Performed by: PATHOLOGY

## 2020-12-07 PROCEDURE — 80048 BASIC METABOLIC PNL TOTAL CA: CPT | Performed by: PATHOLOGY

## 2020-12-07 PROCEDURE — 99396 PREV VISIT EST AGE 40-64: CPT | Performed by: PEDIATRICS

## 2020-12-07 PROCEDURE — 80061 LIPID PANEL: CPT | Performed by: PATHOLOGY

## 2020-12-07 PROCEDURE — 36415 COLL VENOUS BLD VENIPUNCTURE: CPT | Performed by: PATHOLOGY

## 2020-12-07 ASSESSMENT — PAIN SCALES - GENERAL: PAINLEVEL: NO PAIN (0)

## 2020-12-07 NOTE — PROGRESS NOTES
3  SUBJECTIVE:   CC: Jack Mello is an 55 year old male who presents for preventive health visit.       Healthy Habits:    Do you get at least three servings of calcium containing foods daily (dairy, green leafy vegetables, etc.)? yes    Amount of exercise or daily activities, outside of work: 3-4  day(s) per week , likes Yoga    Problems taking medications regularly No    Medication side effects: No    Have you had an eye exam in the past two years? no    Do you see a dentist twice per year? yes    Do you have sleep apnea, excessive snoring or daytime drowsiness?no      He has been able exercise some, would like to do more.   Today's PHQ-2 Score:   PHQ-2 ( 1999 [employer]) 12/7/2020 4/2/2019   Q1: Little interest or pleasure in doing things 0 0   Q2: Feeling down, depressed or hopeless 0 0   PHQ-2 Score 0 0       Abuse: Current or Past(Physical, Sexual or Emotional)- No  Do you feel safe in your environment? Yes    He has the following concerns: annual PE  1. Shingles vaccine  2. Headaches  3. Middle/ low back pain about 1-2 times per year; flares with physical activity.  He started having this pain about 15 years ago with an event.  It has never caused him to be on bedrest, no radicular symptoms in his legs.     Social History     Tobacco Use     Smoking status: Never Smoker     Smokeless tobacco: Never Used   Substance Use Topics     Alcohol use: Yes     Comment: one per month     If you drink alcohol do you typically have >3 drinks per day or >7 drinks per week? No                      Last PSA:   PSA   Date Value Ref Range Status   10/24/2018 0.97 0 - 4 ug/L Final     Comment:     Assay Method:  Chemiluminescence using Siemens Vista analyzer       Reviewed orders with patient. Reviewed health maintenance and updated orders accordingly - Yes  Labs reviewed in EPIC    Reviewed and updated as needed this visit by clinical staff  Tobacco  Allergies  Meds              Reviewed and updated as needed this visit by  Provider                Past Medical History:   Diagnosis Date     Anxiety 2009    associated with mom's passing away      Past Surgical History:   Procedure Laterality Date     COLONOSCOPY  2015    Hemorrhoids, repeat in 2025     LASIK       PHOTOREFRACTIVE KERATECTOMY UNILATERAL STANDARD OR CUSTOMVUE W OR W/O MITOMYCIN Right 12/14/2015    Procedure: PHOTOREFRACTIVE KERATECTOMY UNILATERAL STANDARD OR CUSTOMVUE W OR W/O MITOMYCIN;  Surgeon: Luis Daniel Walton MD;  Location: [facility]     PHOTOREFRACTIVE KERATECTOMY UNILATERAL STANDARD OR CUSTOMVUE W OR W/O MITOMYCIN Left 12/14/2015    Procedure: PHOTOREFRACTIVE KERATECTOMY UNILATERAL STANDARD OR CUSTOMVUE W OR W/O MITOMYCIN;  Surgeon: Luis Daniel Walton MD;  Location: [facility]     WAVESCAN SCREENING Bilateral 12/14/2015    Procedure: WAVESCAN SCREENING;  Surgeon: Luis Daniel Walton MD;  Location: [facility]       ROS:  Seasonal allergies, spring, takes OTC meds (Zyrtec); occasional heartburn, due to eating dinner later, occasional TUMS.  Reduced urinary stream; frontal headaches over the eyes; usually later in the day, occasional every few weeks.  Can sometimes feel them coming on.  Light makes it worse.    CONSTITUTIONAL: NEGATIVE for fever, chills, change in weight  INTEGUMENTARY/SKIN: NEGATIVE for worrisome rashes, moles or lesions  EYES: NEGATIVE for vision changes or irritation  ENT: NEGATIVE for ear, mouth and throat problems  RESP: NEGATIVE for significant cough or SOB  CV: NEGATIVE for chest pain, palpitations or peripheral edema  GI: NEGATIVE for nausea, abdominal pain, heartburn, or change in bowel habits  MUSCULOSKELETAL:POSITIVE  for back pain at upper lumbar/lower thorasic region  NEURO: NEGATIVE for weakness, dizziness or paresthesias  HEME/ALLERGY/IMMUNE: NEGATIVE for bleeding problems  PSYCHIATRIC: NEGATIVE for changes in mood or affect    OBJECTIVE:   /83 (BP Location: Right arm, Patient Position: Sitting, Cuff Size: Adult Large)   Pulse 71   Wt  "77.1 kg (170 lb)   SpO2 98%   BMI 25.85 kg/m    EXAM:  GENERAL: healthy, alert and no distress  EYES: Eyes grossly normal to inspection, PERRL and conjunctivae and sclerae normal  HENT: ear canals and TM's normal, nose and mouth without ulcers or lesions  NECK: no adenopathy, no asymmetry, masses, or scars and thyroid normal to palpation  RESP: lungs clear to auscultation - no rales, rhonchi or wheezes  CV: regular rate and rhythm, normal S1 S2, no S3 or S4, no murmur, click or rub, no peripheral edema and peripheral pulses strong  ABDOMEN: soft, nontender, no hepatosplenomegaly, no masses and bowel sounds normal  MS: no gross musculoskeletal defects noted, no edema  SKIN: no suspicious lesions or rashes  NEURO: Normal strength and tone, mentation intact and speech normal  PSYCH: mentation appears normal, affect normal/bright    Diagnostic Test Results:  Labs reviewed in Epic    ASSESSMENT/PLAN:   1. Annual physical exam  Pt with elevated lipids in the past, due for recheck.  Discussed Hep C screening and checking PSA with slow urinary stream.    - Lipid Profile FASTING; Future  - Basic metabolic panel; Future  - PSA screen; Future  - HCV Screen with Reflex; Future    Patient has been advised of split billing requirements and indicates understanding: Yes  COUNSELING:  Reviewed preventive health counseling, as reflected in patient instructions       Regular exercise       Healthy diet/nutrition       Vision screening       Consider Hep C screening for all patients one time for ages 18-79 years       Colon cancer screening       Prostate cancer screening    Estimated body mass index is 25.85 kg/m  as calculated from the following:    Height as of 2/6/17: 1.727 m (5' 8\").    Weight as of this encounter: 77.1 kg (170 lb).        He reports that he has never smoked. He has never used smokeless tobacco.      Counseling Resources:  ATP IV Guidelines  Pooled Cohorts Equation Calculator  FRAX Risk Assessment  ICSI " Preventive Guidelines  Dietary Guidelines for Americans, 2010  [employer]'s MyPlate  ASA Prophylaxis  Lung CA Screening    Edi Sumner MD  [facility] [place]  Answers for HPI/ROS submitted by the patient on 12/6/2020   General Symptoms: Yes  Skin Symptoms: No  HENT Symptoms: No  EYE SYMPTOMS: Yes  HEART SYMPTOMS: No  LUNG SYMPTOMS: No  INTESTINAL SYMPTOMS: No  URINARY SYMPTOMS: No  REPRODUCTIVE SYMPTOMS: No  SKELETAL SYMPTOMS: No  BLOOD SYMPTOMS: No  NERVOUS SYSTEM SYMPTOMS: No  MENTAL HEALTH SYMPTOMS: Yes  Fever: No  Loss of appetite: No  Weight loss: No  Weight gain: No  Fatigue: No  Night sweats: No  Chills: No  Increased stress: Yes  Excessive hunger: No  Excessive thirst: No  Feeling hot or cold when others believe the temperature is normal: No  Loss of height: No  Post-operative complications: No  Surgical site pain: No  Hallucinations: No  Change in or Loss of Energy: No  Hyperactivity: No  Confusion: No  Eye pain: No  Vision loss: No  Dry eyes: Yes  Watery eyes: No  Eye bulging: No  Double vision: No  Flashing of lights: No  Spots: No  Floaters: No  Redness: No  Crossed eyes: No  Tunnel Vision: No  Yellowing of eyes: No  Eye irritation: No  Nervous or Anxious: Yes  Depression: Yes  Trouble sleeping: No  Trouble thinking or concentrating: Yes  Mood changes: Yes  Panic attacks: No

## 2020-12-07 NOTE — NURSING NOTE
Chief Complaint   Patient presents with     Physical     Pt would like physical exam today      VIDYA Uribe at 3:59 PM sign on 12/7/2020

## 2020-12-07 NOTE — PATIENT INSTRUCTIONS
Primary Care Center Medication Refill Request Information:  * Please contact your pharmacy regarding ANY request for medication refills.  ** [facility] Prescription Fax = [phone]  * Please allow 3 business days for routine medication refills.  * Please allow 5 business days for controlled substance medication refills.     Primary Care Center Test Result notification information:  *You will be notified with in 7-10 days of your appointment day regarding the results of your test.  If you are on MyChart you will be notified as soon as the provider has reviewed the results and signed off on them.    [facility] Center: [phone]     Your health care Provider has recommended that you receive the new Shingle vaccine called Shingrix to prevent shingles for ages 50 and above. Many private insurance and Medicare Part D plans cover Shingrix. However, not all insurance carriers cover the entire cost of the Shingrix vaccine if the vaccine is administered at your primary care clinic. The clinic cannot determine your insurance benefits.  Please call your insurance carrier prior. The vaccine comes in two doses. Your second dose should be 2-6 months from your first dose.       Prior to receiving the vaccine, we recommend that you call your insurance carrier and ask them the following questions:            1. Is there a cost difference if I receive the vaccine at my doctor's office or a pharmacy?          2. Does my insurance cover the Shingrix Vaccine and administration of the vaccine?          3. What is my co-pay or deductible for the vaccine?        Please call to schedule a Nurse-Visit only at [phone].  Nurse Visit hours are available Monday, Wednesday, and Friday from 9:00 AM-11:00 AM and 1:00 PM-3:00 PM.

## 2020-12-08 LAB — HCV AB SERPL QL IA: NONREACTIVE

## 2021-01-08 ENCOUNTER — TRANSFERRED RECORDS (OUTPATIENT)
Dept: HEALTH INFORMATION MANAGEMENT | Facility: CLINIC | Age: 56
End: 2021-01-08

## 2021-03-30 ENCOUNTER — TELEPHONE (OUTPATIENT)
Dept: CARDIOLOGY | Facility: CLINIC | Age: 56
End: 2021-03-30

## 2021-03-30 NOTE — TELEPHONE ENCOUNTER
Left message for patient to call back to schedule for [facility] Referral     Michelle Ochoa on 3/30/2021 at 10:41 AM

## 2021-07-06 ENCOUNTER — TRANSFERRED RECORDS (OUTPATIENT)
Dept: HEALTH INFORMATION MANAGEMENT | Facility: CLINIC | Age: 56
End: 2021-07-06

## 2021-09-26 ENCOUNTER — HEALTH MAINTENANCE LETTER (OUTPATIENT)
Age: 56
End: 2021-09-26

## 2022-01-16 ENCOUNTER — HEALTH MAINTENANCE LETTER (OUTPATIENT)
Age: 57
End: 2022-01-16

## 2022-01-28 DIAGNOSIS — Z13.6 CARDIOVASCULAR SCREENING; LDL GOAL LESS THAN 100: Primary | ICD-10-CM

## 2022-05-05 ENCOUNTER — LAB (OUTPATIENT)
Dept: LAB | Facility: CLINIC | Age: 57
End: 2022-05-05

## 2022-05-05 ENCOUNTER — OFFICE VISIT (OUTPATIENT)
Dept: CARDIOLOGY | Facility: CLINIC | Age: 57
End: 2022-05-05
Attending: PEDIATRICS
Payer: COMMERCIAL

## 2022-05-05 VITALS
HEART RATE: 72 BPM | DIASTOLIC BLOOD PRESSURE: 74 MMHG | SYSTOLIC BLOOD PRESSURE: 123 MMHG | BODY MASS INDEX: 25.98 KG/M2 | WEIGHT: 171.4 LBS | OXYGEN SATURATION: 96 % | HEIGHT: 68 IN

## 2022-05-05 DIAGNOSIS — Z13.6 CARDIOVASCULAR SCREENING; LDL GOAL LESS THAN 100: ICD-10-CM

## 2022-05-05 DIAGNOSIS — Z13.6 CARDIOVASCULAR SCREENING; LDL GOAL LESS THAN 100: Primary | ICD-10-CM

## 2022-05-05 LAB
ANION GAP SERPL CALCULATED.3IONS-SCNC: 4 MMOL/L (ref 3–14)
BUN SERPL-MCNC: 16 MG/DL (ref 7–30)
CALCIUM SERPL-MCNC: 8.9 MG/DL (ref 8.5–10.1)
CHLORIDE BLD-SCNC: 106 MMOL/L (ref 94–109)
CHOLEST SERPL-MCNC: 200 MG/DL
CO2 SERPL-SCNC: 28 MMOL/L (ref 20–32)
CREAT SERPL-MCNC: 1.14 MG/DL (ref 0.66–1.25)
CREAT UR-MCNC: 208 MG/DL
CRP SERPL HS-MCNC: 3 MG/L
FASTING STATUS PATIENT QL REPORTED: YES
GFR SERPL CREATININE-BSD FRML MDRD: 75 ML/MIN/1.73M2
GLUCOSE BLD-MCNC: 103 MG/DL (ref 70–99)
HDLC SERPL-MCNC: 36 MG/DL
LDLC SERPL CALC-MCNC: 135 MG/DL
MICROALBUMIN UR-MCNC: 7 MG/L
MICROALBUMIN/CREAT UR: 3.37 MG/G CR (ref 0–17)
NONHDLC SERPL-MCNC: 164 MG/DL
NT-PROBNP SERPL-MCNC: 8 PG/ML (ref 0–900)
POTASSIUM BLD-SCNC: 4.1 MMOL/L (ref 3.4–5.3)
SODIUM SERPL-SCNC: 138 MMOL/L (ref 133–144)
TRIGL SERPL-MCNC: 143 MG/DL

## 2022-05-05 PROCEDURE — 83695 ASSAY OF LIPOPROTEIN(A): CPT | Mod: 90 | Performed by: PATHOLOGY

## 2022-05-05 PROCEDURE — 99215 OFFICE O/P EST HI 40 MIN: CPT | Mod: 25 | Performed by: NURSE PRACTITIONER

## 2022-05-05 PROCEDURE — 86141 C-REACTIVE PROTEIN HS: CPT | Mod: 90 | Performed by: PATHOLOGY

## 2022-05-05 PROCEDURE — 99000 SPECIMEN HANDLING OFFICE-LAB: CPT | Performed by: PATHOLOGY

## 2022-05-05 PROCEDURE — 36415 COLL VENOUS BLD VENIPUNCTURE: CPT | Performed by: PATHOLOGY

## 2022-05-05 PROCEDURE — 83880 ASSAY OF NATRIURETIC PEPTIDE: CPT | Performed by: PATHOLOGY

## 2022-05-05 PROCEDURE — 82043 UR ALBUMIN QUANTITATIVE: CPT | Performed by: PATHOLOGY

## 2022-05-05 PROCEDURE — 80061 LIPID PANEL: CPT | Performed by: PATHOLOGY

## 2022-05-05 PROCEDURE — 80048 BASIC METABOLIC PNL TOTAL CA: CPT | Performed by: PATHOLOGY

## 2022-05-05 PROCEDURE — 93000 ELECTROCARDIOGRAM COMPLETE: CPT | Performed by: INTERNAL MEDICINE

## 2022-05-05 PROCEDURE — 93922 UPR/L XTREMITY ART 2 LEVELS: CPT | Performed by: NURSE PRACTITIONER

## 2022-05-05 NOTE — PROGRESS NOTES
[facility] for Cardiovascular Disease Prevention - Exam Note    Active Problems   Patient Active Problem List    Diagnosis Date Noted     Myopia 10/22/2014     Priority: Medium       Reason For Visit   Patient here for [facility] early detection of atherosclerosis and CVD exam.    Pain Evaluation  Current history of pain associated with this visit is: denied    Cardiac risk factors:  - age - smoking  - elevated BMI + Family history CVD  - Diet - Hypertension    HPI   Jack Mello is a 57 year old year old male with a familly history of cardiovascular disease.  His mother passed away at age 69 following a MI. He does not check his BP at home. He had a stress echo in 2015, EF 60-65% and negative for ischemia.  His primary care provider is Dr. Edi Sumner at  [facility]. 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 He gets dizzy with quick movements of his hear and seasonal sinus allergies. Today in clinic he denies chest pain at rest, with activity, while sleeping, SOB at rest, with activity or while sleeping, palpitations, lightheadedness, lower leg edema, calf cramps or indigestion.    Nutrition assessment per patient report:   Fruits and vegetables (  cup cooked, 1 cup raw):  2-4 servings of vegetables, 1-3 servings fruit/day  Caffeine (1 cup coffee, soda, etc):  1-3 cups of tea or 3-4 cups of coffee  Alcohol servings (12 oz. beer, 4 oz. wine, 1  oz. in mixed drink):  rarely  Typical breakfast:  Cereal, toast, fruit and yogurt                 Lunch: yogurt                 Dinner: salad, meat, pasta                 Snacks: no                 Drinks:  Water, juice, mill  Activity  Patient is active walking 3-4x/week 1 mile, lifts weights occasionally    Sleep pattern: Good    Laboratory Results Review  We discussed laboratory results today including lipids targets and how foods influence cholesterol.    Weight  His perceived healthy weight is 160-165 pounds.  A normal BMI  of 25 is equal to 164 pounds.  The current BMI of 25.87 is normal weight range .    PMH   Past Medical History:   Diagnosis Date     Anxiety     associated with mom's passing away       PSH  Past Surgical History:   Procedure Laterality Date     COLONOSCOPY      Hemorrhoids, repeat in      LASIK       PHOTOREFRACTIVE KERATECTOMY UNILATERAL STANDARD OR CUSTOMVUE W OR W/O MITOMYCIN Right 2015    Procedure: PHOTOREFRACTIVE KERATECTOMY UNILATERAL STANDARD OR CUSTOMVUE W OR W/O MITOMYCIN;  Surgeon: Luis Daniel Walton MD;  Location: [facility]     PHOTOREFRACTIVE KERATECTOMY UNILATERAL STANDARD OR CUSTOMVUE W OR W/O MITOMYCIN Left 2015    Procedure: PHOTOREFRACTIVE KERATECTOMY UNILATERAL STANDARD OR CUSTOMVUE W OR W/O MITOMYCIN;  Surgeon: Luis Daniel Walton MD;  Location: [facility]     WAVESCAN SCREENING Bilateral 2015    Procedure: WAVESCAN SCREENING;  Surgeon: Luis Daniel Walton MD;  Location: [facility]       Current Meds   Current Outpatient Medications   Medication Sig Dispense Refill     multivitamin, therapeutic with minerals (MULTI-VITAMIN) TABS Take 1 tablet by mouth daily         Allergies    No Known Allergies    Family Hx   Family History   Problem Relation Age of Onset     Coronary Artery Disease Mother 69        s/p PI,  of cardiac arrest, s/p PCI/MARCUS, no brain function     Cancer Father 39        thyroid cancer     GI problems Brother      Cancer - colorectal No family hx of      Prostate Cancer No family hx of      Alcohol/Drug No family hx of      Glaucoma No family hx of      Macular Degeneration No family hx of        Social History  He wroks full time.   He is  1 son.     Tobacco History  History   Smoking Status     Never Smoker   Smokeless Tobacco     Never Used       ROS  CONSTITUTIONAL:  No fever, chills, or sweats. No weight gain/loss.   EENT:  No visual disturbance, ear ache, epistaxis, sore throat  RESPIRATORY:  No cough, hemoptysis  CARDIOVASCULAR:  As per  "HPI  GI:  No nausea, vomiting, hematemesis, melena  :  No urinary frequency, dysuria, or hematuria  INTEGUMENT:  Negative  PSYCHIATRIC:  Negative  NEURO:  Negative  ENDOCRINE:  Negative  MUSCULOSKELETAL:  Negative     Vital Signs   /74 (BP Location: Right arm, Patient Position: Sitting, Cuff Size: Adult Regular)   Pulse 72   Ht 1.734 m (5' 8.25\")   Wt 77.7 kg (171 lb 6.4 oz)   SpO2 96%   BMI 25.87 kg/m        Waist: 35 inches  Hip: 38 inches    Physical Exam   In general, the patient is a pleasant male in no apparent distress   HEENT: NC/AT, PERRLA, EOMI, sclerae white, not injected. Nares clear, pharynx without erythema or exudate, dentition intact    Neck: No adenopathy, no thyromegaly, carotids +4/4 bilaterally without bruits,  no jugular venous distension   Lungs: Breath sounds clear bilaterally, without crackles, ronchi, or wheezes  Cor: RRR, S1S2 without murmur, rub, click, or gallop, the PMI is in the 5th ICS in the midclavicular line  Abdomen: Soft, nontender, nondistended, BS+ in all 4 quadrants, without hepatomegaly, no aorta or renal artery bruits  Extremities: No clubbing, cyanosis, or edema. DP and PT pulses +2/4 bilaterally    The 10-year ASCVD risk score ([place] JAMAR Jr., et al., 2013) is: 7.5%  Values used to calculate the score:   Age: 57 year old   Sex: male   Is Non- : No   Diabetic: No   Tobacco smoker: No   Systolic Blood Press: 123 mmHg   Is BP treated: No   HDL Cholesterol: 36 mg/dL   Total Cholesterol: 200 mg/dL    Recent Labs  Lab Results   Component Value Date     (H) 05/05/2022    GLC 88 12/07/2020      Lab Results   Component Value Date    NTBNP 8 05/05/2022     No results found for: NTBNPI   Lab Results   Component Value Date    UCRR 208 05/05/2022      Lab Results   Component Value Date    MICROL 7 05/05/2022      No results found for: MICROALBUMIN   Lab Results   Component Value Date    CRP 3.0 05/05/2022      Lab Results   Component Value Date "    CHOL 200 (H) 05/05/2022    CHOL 229 (H) 12/07/2020      Lab Results   Component Value Date    TRIG 143 05/05/2022    TRIG 138 12/07/2020      Lab Results   Component Value Date    HDL 36 (L) 05/05/2022    HDL 37 (L) 12/07/2020      Lab Results   Component Value Date     (H) 05/05/2022     (H) 12/07/2020      Lab Results   Component Value Date    VLDL 36 (H) 11/06/2014      Lab Results   Component Value Date    CHOLHDLRATIO 6.0 (H) 11/06/2014     Lab Results   Component Value Date    NHDL 164 (H) 05/05/2022    NHDL 192 (H) 12/07/2020        Assessment:    Cardiovascular:  Asymptomatic, he is not complaining of chest pain, recommend coronary artery calcium score to further evaluate his risk of coronary artery disease    Blood Pressure:  He does not take BP medication, -123/74-82 mmHg    Lipids:  He does not take a statin medication  !LIPID/HEPATIC Latest Ref Rng & Units 11/6/2014 10/24/2018 12/7/2020   CHOL <200 mg/dL 208 (H) 220 (H) 229 (H)   TRIGLYCERIDES <150 mg/dL 179 (H) 168 (H) 138   HDL >=40 mg/dL 35 (L) 34 (L) 37 (L)   LDL <=100 mg/dL 138 (H) 152 (H) 165 (H)   VLDL 0 - 30 mg/dL 36 (H)     NHDL <130 mg/dL  186 (H) 192 (H)   CHOLHDLRATIO 0.0 - 5.0 6.0 (H)     AST 0 - 45 U/L  74 (H)    ALT 0 - 70 U/L  145 (H)      !LIPID/HEPATIC Latest Ref Rng & Units 5/5/2022   CHOL <200 mg/dL 200 (H)   TRIGLYCERIDES <150 mg/dL 143   HDL >=40 mg/dL 36 (L)   LDL <=100 mg/dL 135 (H)   VLDL 0 - 30 mg/dL    NHDL <130 mg/dL 164 (H)   CHOLHDLRATIO 0.0 - 5.0    AST 0 - 45 U/L    ALT 0 - 70 U/L        Glucose: 103, continue to monitor with PCP    Sleep pattern:  Sleep hygiene reviewed during clinic visit, handout given to patient    Weight Management: BMI 25.87    Return to Clinic: 5 years    Health Habit Summary:  Nutrition: Heart Healthy Eating:  most of the time   Exercise:  regularly active  Weight:  normal weight range  Tobacco Use:  never used    Full report to follow prevention team review of test results  with scanned final report.    Time spent for patient visit was 60 minutes with more than half the time spent on counseling and coordination of care.    ANGELINE Machado CNP       CC  Patient Care Team:  Karen Patel MD as PCP - General (Internal Medicine)  Marcos Potts MD as MD (Colon and Rectal Surgery)  Kassy Osei APRN CNP as Nurse Practitioner  Karen Patel MD as Assigned PCP  KAREN PATEL

## 2022-05-05 NOTE — LETTER
5/5/2022      RE: Jack Mello  [address]  [facility] [address]       Dear Colleague,    Thank you for the opportunity to participate in the care of your patient, Jack Mello, at the [facility] [place] at [facility]. Please see a copy of my visit note below.      [facility] for Cardiovascular Disease Prevention - Exam Note    Active Problems   Patient Active Problem List    Diagnosis Date Noted     Myopia 10/22/2014     Priority: Medium       Reason For Visit   Patient here for [facility] early detection of atherosclerosis and CVD exam.    Pain Evaluation  Current history of pain associated with this visit is: denied    Cardiac risk factors:  - age - smoking  - elevated BMI + Family history CVD  - Diet - Hypertension    HPI   Jack Mello is a 57 year old year old male with a familly history of cardiovascular disease.  His mother passed away at age 69 following a MI. He does not check his BP at home. He had a stress echo in 2015, EF 60-65% and negative for ischemia.  His primary care provider is Dr. Edi Sumner at [facility]. 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 He gets dizzy with quick movements of his hear and seasonal sinus allergies. Today in clinic he denies chest pain at rest, with activity, while sleeping, SOB at rest, with activity or while sleeping, palpitations, lightheadedness, lower leg edema, calf cramps or indigestion.    Nutrition assessment per patient report:   Fruits and vegetables (  cup cooked, 1 cup raw):  2-4 servings of vegetables, 1-3 servings fruit/day  Caffeine (1 cup coffee, soda, etc):  1-3 cups of tea or 3-4 cups of coffee  Alcohol servings (12 oz. beer, 4 oz. wine, 1  oz. in mixed drink):  rarely  Typical breakfast:  Cereal, toast, fruit and yogurt                 Lunch: yogurt                 Dinner: salad,  meat, pasta                 Snacks: no                 Drinks:  Water, juice, mill  Activity  Patient is active walking 3-4x/week 1 mile, lifts weights occasionally    Sleep pattern: Good    Laboratory Results Review  We discussed laboratory results today including lipids targets and how foods influence cholesterol.    Weight  His perceived healthy weight is 160-165 pounds.  A normal BMI of 25 is equal to 164 pounds.  The current BMI of 25.87 is normal weight range .    PMH   Past Medical History:   Diagnosis Date     Anxiety     associated with mom's passing away       PSH  Past Surgical History:   Procedure Laterality Date     COLONOSCOPY      Hemorrhoids, repeat in      LASIK       PHOTOREFRACTIVE KERATECTOMY UNILATERAL STANDARD OR CUSTOMVUE W OR W/O MITOMYCIN Right 2015    Procedure: PHOTOREFRACTIVE KERATECTOMY UNILATERAL STANDARD OR CUSTOMVUE W OR W/O MITOMYCIN;  Surgeon: Luis Daniel Walton MD;  Location: [facility]     PHOTOREFRACTIVE KERATECTOMY UNILATERAL STANDARD OR CUSTOMVUE W OR W/O MITOMYCIN Left 2015    Procedure: PHOTOREFRACTIVE KERATECTOMY UNILATERAL STANDARD OR CUSTOMVUE W OR W/O MITOMYCIN;  Surgeon: Luis Daniel Walton MD;  Location: [facility]     WAVESCAN SCREENING Bilateral 2015    Procedure: WAVESCAN SCREENING;  Surgeon: Luis Daniel Walton MD;  Location: [facility]       Current Meds   Current Outpatient Medications   Medication Sig Dispense Refill     multivitamin, therapeutic with minerals (MULTI-VITAMIN) TABS Take 1 tablet by mouth daily         Allergies    No Known Allergies    Family Hx   Family History   Problem Relation Age of Onset     Coronary Artery Disease Mother 69        s/p PI,  of cardiac arrest, s/p PCI/MARCUS, no brain function     Cancer Father 39        thyroid cancer     GI problems Brother      Cancer - colorectal No family hx of      Prostate Cancer No family hx of      Alcohol/Drug No family hx of      Glaucoma No family hx of      Macular  "Degeneration No family hx of        Social History  He wroks full time.   He is  1 son.     Tobacco History  History   Smoking Status     Never Smoker   Smokeless Tobacco     Never Used       ROS  CONSTITUTIONAL:  No fever, chills, or sweats. No weight gain/loss.   EENT:  No visual disturbance, ear ache, epistaxis, sore throat  RESPIRATORY:  No cough, hemoptysis  CARDIOVASCULAR:  As per HPI  GI:  No nausea, vomiting, hematemesis, melena  :  No urinary frequency, dysuria, or hematuria  INTEGUMENT:  Negative  PSYCHIATRIC:  Negative  NEURO:  Negative  ENDOCRINE:  Negative  MUSCULOSKELETAL:  Negative     Vital Signs   /74 (BP Location: Right arm, Patient Position: Sitting, Cuff Size: Adult Regular)   Pulse 72   Ht 1.734 m (5' 8.25\")   Wt 77.7 kg (171 lb 6.4 oz)   SpO2 96%   BMI 25.87 kg/m        Waist: 35 inches  Hip: 38 inches    Physical Exam   In general, the patient is a pleasant male in no apparent distress   HEENT: NC/AT, PERRLA, EOMI, sclerae white, not injected. Nares clear, pharynx without erythema or exudate, dentition intact    Neck: No adenopathy, no thyromegaly, carotids +4/4 bilaterally without bruits,  no jugular venous distension   Lungs: Breath sounds clear bilaterally, without crackles, ronchi, or wheezes  Cor: RRR, S1S2 without murmur, rub, click, or gallop, the PMI is in the 5th ICS in the midclavicular line  Abdomen: Soft, nontender, nondistended, BS+ in all 4 quadrants, without hepatomegaly, no aorta or renal artery bruits  Extremities: No clubbing, cyanosis, or edema. DP and PT pulses +2/4 bilaterally    The 10-year ASCVD risk score ([place] JAMAR Jr., et al., 2013) is: 7.5%  Values used to calculate the score:   Age: 57 year old   Sex: male   Is Non- : No   Diabetic: No   Tobacco smoker: No   Systolic Blood Press: 123 mmHg   Is BP treated: No   HDL Cholesterol: 36 mg/dL   Total Cholesterol: 200 mg/dL    Recent Labs  Lab Results   Component Value Date    GLC " 103 (H) 05/05/2022    GLC 88 12/07/2020      Lab Results   Component Value Date    NTBNP 8 05/05/2022     No results found for: NTBNPI   Lab Results   Component Value Date    UCRR 208 05/05/2022      Lab Results   Component Value Date    MICROL 7 05/05/2022      No results found for: MICROALBUMIN   Lab Results   Component Value Date    CRP 3.0 05/05/2022      Lab Results   Component Value Date    CHOL 200 (H) 05/05/2022    CHOL 229 (H) 12/07/2020      Lab Results   Component Value Date    TRIG 143 05/05/2022    TRIG 138 12/07/2020      Lab Results   Component Value Date    HDL 36 (L) 05/05/2022    HDL 37 (L) 12/07/2020      Lab Results   Component Value Date     (H) 05/05/2022     (H) 12/07/2020      Lab Results   Component Value Date    VLDL 36 (H) 11/06/2014      Lab Results   Component Value Date    CHOLHDLRATIO 6.0 (H) 11/06/2014     Lab Results   Component Value Date    NHDL 164 (H) 05/05/2022    NHDL 192 (H) 12/07/2020        Assessment:    Cardiovascular:  Asymptomatic, he is not complaining of chest pain, recommend coronary artery calcium score to further evaluate his risk of coronary artery disease    Blood Pressure:  He does not take BP medication, -123/74-82 mmHg    Lipids:  He does not take a statin medication  !LIPID/HEPATIC Latest Ref Rng & Units 11/6/2014 10/24/2018 12/7/2020   CHOL <200 mg/dL 208 (H) 220 (H) 229 (H)   TRIGLYCERIDES <150 mg/dL 179 (H) 168 (H) 138   HDL >=40 mg/dL 35 (L) 34 (L) 37 (L)   LDL <=100 mg/dL 138 (H) 152 (H) 165 (H)   VLDL 0 - 30 mg/dL 36 (H)     NHDL <130 mg/dL  186 (H) 192 (H)   CHOLHDLRATIO 0.0 - 5.0 6.0 (H)     AST 0 - 45 U/L  74 (H)    ALT 0 - 70 U/L  145 (H)      !LIPID/HEPATIC Latest Ref Rng & Units 5/5/2022   CHOL <200 mg/dL 200 (H)   TRIGLYCERIDES <150 mg/dL 143   HDL >=40 mg/dL 36 (L)   LDL <=100 mg/dL 135 (H)   VLDL 0 - 30 mg/dL    NHDL <130 mg/dL 164 (H)   CHOLHDLRATIO 0.0 - 5.0    AST 0 - 45 U/L    ALT 0 - 70 U/L        Glucose: 103, continue  to monitor with PCP    Sleep pattern:  Sleep hygiene reviewed during clinic visit, handout given to patient    Weight Management: BMI 25.87    Return to Clinic: 5 years    Health Habit Summary:  Nutrition: Heart Healthy Eating:  most of the time   Exercise:  regularly active  Weight:  normal weight range  Tobacco Use:  never used    Full report to follow prevention team review of test results with scanned final report.    Time spent for patient visit was 60 minutes with more than half the time spent on counseling and coordination of care.    ANGELINE Machado CNP       CC  Patient Care Team:  Karen Patel MD as PCP - General (Internal Medicine)  Marcos Potts MD as MD (Colon and Rectal Surgery)  Kassy Osei APRN CNP as Nurse Practitioner  Karen Patel MD as Assigned PCP  KAREN PATEL Test Results    WALKING BLOOD PRESSURE RESPONSE (3 minute, 5 MET level walk)   Pre BP: 110/70 mmHg  3 min BP: 122/58 mmHg  1 min post BP: 110/70 mmHg    Pre HR: 62 bpm  3 min HR: 115 bpm  1 min post HR: 50 bpm     Test results: walking blood pressure response to 3 minutes activity is in normal range .    ABDOMINAL AORTA ULTRASOUND (< 2.5 normal, borderline 2.5-2.9, abnormal > 3)   SupraIliac 2.1 cm    SupraRenal 1.9 cm    InfraRenal Proximal 1.9 cm    InfraRenal Distal 1.9 cm      Abdominal Aorta Assessment:  normal    LEFT VENTRICULAR ULTRASOUND MEASUREMENTS (adjusted for BSA)  LVIDD 46.70 mm   Septa 8.4 mm   Posterior 8.1 mm     Left Ventricular US Assessment:  normal    Carotid Artery IMT measurements report and plaques in the small area examined:   Left IMT 0.750 mm  Plaques none    Right IMT 0.517 mm  Plaques none     Test results: carotid artery wall thickening is in normal range after adjusting for age and gender.     ECG (see tracing):  normal sinus rhythm;  rate: 67 bpm    Arterial Elasticity per age and gender (see printout):   C1 12.6 mL/mmHg x 10  normal   C2 8.7 mL/mmHg x  100 normal   Supine blood pressure:138/79 mmHg     Test results: arterial elasticity of the large and small size arteries is in normal range after adjusting for age and gender.     Jess Johnson      Please do not hesitate to contact me if you have any questions/concerns.     Sincerely,     ANGELINE Machado CNP

## 2022-05-06 NOTE — PROGRESS NOTES
Perdomo Test Results    WALKING BLOOD PRESSURE RESPONSE (3 minute, 5 MET level walk)   Pre BP: 110/70 mmHg  3 min BP: 122/58 mmHg  1 min post BP: 110/70 mmHg    Pre HR: 62 bpm  3 min HR: 115 bpm  1 min post HR: 50 bpm     Test results: walking blood pressure response to 3 minutes activity is in normal range .    ABDOMINAL AORTA ULTRASOUND (< 2.5 normal, borderline 2.5-2.9, abnormal > 3)   SupraIliac 2.1 cm    SupraRenal 1.9 cm    InfraRenal Proximal 1.9 cm    InfraRenal Distal 1.9 cm      Abdominal Aorta Assessment:  normal    LEFT VENTRICULAR ULTRASOUND MEASUREMENTS (adjusted for BSA)  LVIDD 46.70 mm   Septa 8.4 mm   Posterior 8.1 mm     Left Ventricular US Assessment:  normal    Carotid Artery IMT measurements report and plaques in the small area examined:   Left IMT 0.750 mm  Plaques none    Right IMT 0.517 mm  Plaques none     Test results: carotid artery wall thickening is in normal range after adjusting for age and gender.     ECG (see tracing):  normal sinus rhythm;  rate: 67 bpm    Arterial Elasticity per age and gender (see printout):   C1 12.6 mL/mmHg x 10  normal   C2 8.7 mL/mmHg x 100 normal   Supine blood pressure:138/79 mmHg     Test results: arterial elasticity of the large and small size arteries is in normal range after adjusting for age and gender.     Jess Johnson

## 2022-05-07 LAB — LPA SERPL-MCNC: 32 MG/DL

## 2022-05-09 LAB
ATRIAL RATE - MUSE: 67 BPM
DIASTOLIC BLOOD PRESSURE - MUSE: NORMAL MMHG
INTERPRETATION ECG - MUSE: NORMAL
P AXIS - MUSE: 63 DEGREES
PR INTERVAL - MUSE: 180 MS
QRS DURATION - MUSE: 94 MS
QT - MUSE: 408 MS
QTC - MUSE: 431 MS
R AXIS - MUSE: -4 DEGREES
SYSTOLIC BLOOD PRESSURE - MUSE: NORMAL MMHG
T AXIS - MUSE: 49 DEGREES
VENTRICULAR RATE- MUSE: 67 BPM

## 2022-05-19 NOTE — PATIENT INSTRUCTIONS
Your total cholesterol and LDL (bad cholesterol) results are in the  optimal  range. Your other cholesterol numbers are also at goal.  We recommend continuation of ongoing health habit modification (heart healthy nutrition, maintaining your weight within a normal weight range (Body mass index < 25) and an exercise routine) to maintain these levels.  Your lipoprotein level is 32, normal range is < 29.      2. Your blood glucose (blood sugar) is higher than normal and may indicate insulin resistance (consistent glucose levels of 100-109 mg/dL).  Your body makes enough insulin, but the cells do not utilize it well.  Glucose levels vary from hour to hour depending on your weight and eating habits.  Maintaining a healthy weight is often effective in maintaining a normal blood glucose level and delaying or preventing the development of diabetes over time.  We recommend re-checking your blood glucose level and a baseline HA1C with your primary care provider within 6-12 months.    3. Your C-reactive protein is elevated.  An elevated CRP is an indicator of inflammation.  Inflammation may affect the condition of the blood vessel wall and contribute to the development of arteriosclerosis (fatty deposits with blood vessel thickening).  Conditions such as infections, colds, flu and inflammatory conditions can cause CRP to elevate for a period of time and return to normal.  The specific cause of your elevated CRP is unknown.  No specific treatment to lower CRP is available, but risk factor reduction is important.      4. Great job with your regular exercise regimen!  Regular exercise can help lower cholesterol, blood pressure, blood glucose and improve the health of your heart and blood vessels. The American Heart Association recommends 150 minutes of exercise per week, including strength (resistance training).  Always exercise within your comfort zone (no chest pain, able to talk comfortably).    5. Your diet is heart healthy  and well balanced.  We recommend increasing your intake of vegetables to 4-5 servings/day and increasing your fruit intake to 3-4 servings/day and incorporating healthy fats of the the Mediterranean diet into your diet. Eating salmon and using extra virgin olive oil are good examples. Nutrients found in fruits, vegetables and whole grains have been shown to be beneficial for the long-term health of your heart and blood vessels.     6. We suggest that you consider incorporating 4-7-8 relaxation breathing, mindfulness stress reduction, meditation, yoga,and/or aromatherapy into your healthy lifestyle routine. All of these integrative therapies have been shown to be useful in reducing stress and promoting health. The website for the [facility] at the [facility] is [email or web address]. Taking Charge of Your Health and Wellbeing is a wonderful assessment tool  to learn more about your wellbeing.    7. A coronary artery calcium scan is recommended to further assess your cardiovascular risk. This test may or may not be covered by insurance and costs  approximately $99 ($105 with tax) and can be obtained at a variety of cardiovascular centers. If lung nodules are observed with this test follow up scans may be needed for further evaluation. An order for this diagnostic test has been placed in your electronic medical record.  Call [facility] at [phone] to schedule this non-invasive diagnostic test.     8.  Return to clinic in 5 years    Thank you for choosing to participate in the prevention screening at [facility] CV Prevention clinic.  Cardiovascular prevention screening is important. Atherosclerosis may result in heart attacks, strokes, heart failure, peripheral artery disease.    Ai Donohue, DNP, APRN, FNP-C

## 2022-06-22 ENCOUNTER — HOSPITAL ENCOUNTER (OUTPATIENT)
Dept: CT IMAGING | Facility: CLINIC | Age: 57
Discharge: HOME | End: 2022-06-22
Attending: NURSE PRACTITIONER | Admitting: NURSE PRACTITIONER

## 2022-06-22 DIAGNOSIS — Z13.6 CARDIOVASCULAR SCREENING; LDL GOAL LESS THAN 100: ICD-10-CM

## 2022-06-22 LAB
CV CALCIUM SCORE AGATSTON LM: 0
CV CALCIUM SCORING AGATSON LAD: 0
CV CALCIUM SCORING AGATSTON CX: 0
CV CALCIUM SCORING AGATSTON RCA: 0
CV CALCIUM SCORING AGATSTON TOTAL: 0

## 2022-06-22 PROCEDURE — 75571 CT HRT W/O DYE W/CA TEST: CPT | Mod: 26 | Performed by: INTERNAL MEDICINE

## 2022-06-22 PROCEDURE — 75571 CT HRT W/O DYE W/CA TEST: CPT

## 2022-08-03 NOTE — TELEPHONE ENCOUNTER
FUTURE VISIT INFORMATION      FUTURE VISIT INFORMATION:    Date: 9/10/22    Time: 10:20am    Location: [facility]  REFERRAL INFORMATION:      Reason for visit/diagnosis  general eye exam    RECORDS REQUESTED FROM:       Clinic name Comments Records Status Imaging Status   [facility] OV/notes  10/10/14-4/2/19 EPIC

## 2022-08-29 ENCOUNTER — OFFICE VISIT (OUTPATIENT)
Dept: INTERNAL MEDICINE | Facility: CLINIC | Age: 57
End: 2022-08-29
Payer: COMMERCIAL

## 2022-08-29 VITALS
WEIGHT: 173.6 LBS | DIASTOLIC BLOOD PRESSURE: 79 MMHG | HEIGHT: 68 IN | HEART RATE: 74 BPM | BODY MASS INDEX: 26.31 KG/M2 | SYSTOLIC BLOOD PRESSURE: 118 MMHG | OXYGEN SATURATION: 98 %

## 2022-08-29 DIAGNOSIS — Z00.00 ROUTINE GENERAL MEDICAL EXAMINATION AT A HEALTH CARE FACILITY: Primary | ICD-10-CM

## 2022-08-29 PROCEDURE — 99396 PREV VISIT EST AGE 40-64: CPT | Performed by: PEDIATRICS

## 2022-08-29 ASSESSMENT — PAIN SCALES - GENERAL: PAINLEVEL: NO PAIN (0)

## 2022-08-29 NOTE — PROGRESS NOTES
"SUBJECTIVE:    CC:Jack Mello, a 57 year old male, presents for routine health maintenance exam.    HPI: Pt here for annual PE.  He has not had COVID, he is fully vaccinated.      The patient's medical,family and social histories have been reviewed and updated as needed today.    Contraception: none    REVIEW OF SYSTEMS:  CONSTITUTIONAL:NEGATIVE for fever, chills, change in weight  INTEGUMENTARY/SKIN: NEGATIVE for worrisome rashes, moles or lesions  EYES: NEGATIVE for vision changes or irritation and Hx of LASIK  ENT/MOUTH: NEGATIVE for ear, mouth and throat problems  RESP:NEGATIVE for significant cough or SOB  CV: NEGATIVE for chest pain, palpitations or peripheral edema  GI: NEGATIVE for nausea, abdominal pain, heartburn, or change in bowel habits  :NEGATIVE for frequency, dysuria, hematuria or voiding dysfunction and POSITIVE for decreased flow; no nocturia  MUSCULOSKELETAL:NEGATIVE for significant arthralgias or myalgia; he has a standing desk.  NEURO: NEGATIVE for weakness, dizziness or paresthesias  ENDOCRINE: NEGATIVE for temperature intolerance, skin/hair changes  HEME/ALLERGY/IMMUNE: NEGATIVE for bleeding problems and POSITIVE  for allergies  PSYCHIATRIC: NEGATIVE for changes in mood or affect    EXAM:  /79   Pulse 74   Ht 1.734 m (5' 8.25\")   Wt 78.7 kg (173 lb 9.6 oz)   SpO2 98%   BMI 26.20 kg/m    BMI = Body mass index is 26.2 kg/m .  GENERAL APPEARANCE: healthy, alert and no distress   EYES: Eyes grossly normal to inspection, PERRL and conjunctivae and sclerae normal  HENT: ear canals and TM's normal and nose and mouth without ulcers or lesions  NECK: no adenopathy, no asymmetry, masses, or scars and thyroid normal to palpation  RESP: lungs clear to auscultation - no rales, rhonchi or wheezes  CV: regular rates and rhythm, normal S1 S2, no S3 or S4 and no murmur, click or rub  LYMPHATICS: no cervical adenopathy  ABDOMEN: soft, nontender, without hepatosplenomegaly or masses  : " testicles normal without atrophy or masses, no hernias and penis normal without urethral discharge  RECTAL: deferred  EXT: no cyanosis, clubbing or edema and peripheral pulses are brisk and symmetric in upper and lower extremities  M/SKEL: FROM on exam of UE, LE, neck, and back, No arthritis, or synovitis on exam.  SKIN: no suspicious lesions or rashes  NEURO: Normal strength and tone, mentation intact and speech normal  PSYCH: mentation appears normal and affect normal/bright         ASSESSMENT/PLAN:  57 year old health maintenance exam    Cardiac Risks: Age > 45 (male) or >55 (female)  Patient's Goal  LDL = <130    Patient is screened for GC and Chlamymdia:  No    (Z00.00) Routine general medical examination at a health care facility  (primary encounter diagnosis)  Comment: Immunizations are UTD, colonoscopy UTD, weight stable, patient is exercising.  Praised patient for his efforts.  Perdomo evaluation reviewed, recommended no treatment for hyperlipidemia at this time.    Plan: Follow yearly, discussed below.       I have discussed with the patient the risks, benefits, and treatment options of prescribed medications or other treatment modalities.  I have instructed the patient to call or schedule a follow-up appointment for any problems or failure to improve. I have also discussed the importance of a balanced diet and regular exercise, osteoporosis prevention and accident prevention.

## 2022-09-01 ENCOUNTER — OFFICE VISIT (OUTPATIENT)
Dept: OPHTHALMOLOGY | Facility: CLINIC | Age: 57
End: 2022-09-01
Payer: COMMERCIAL

## 2022-09-01 ENCOUNTER — PRE VISIT (OUTPATIENT)
Dept: OPHTHALMOLOGY | Facility: CLINIC | Age: 57
End: 2022-09-01

## 2022-09-01 VITALS — BODY MASS INDEX: 25.18 KG/M2 | WEIGHT: 170 LBS | HEIGHT: 69 IN

## 2022-09-01 DIAGNOSIS — Z98.890 HISTORY OF REFRACTIVE SURGERY: ICD-10-CM

## 2022-09-01 DIAGNOSIS — H47.323 DRUSEN OF BOTH OPTIC DISCS: ICD-10-CM

## 2022-09-01 DIAGNOSIS — H52.4 PRESBYOPIA OF BOTH EYES: ICD-10-CM

## 2022-09-01 DIAGNOSIS — H04.123 DRY EYES, BILATERAL: ICD-10-CM

## 2022-09-01 DIAGNOSIS — H52.12 MYOPIA, LEFT EYE: Primary | ICD-10-CM

## 2022-09-01 PROCEDURE — 92004 COMPRE OPH EXAM NEW PT 1/>: CPT | Performed by: OPHTHALMOLOGY

## 2022-09-01 PROCEDURE — 92015 DETERMINE REFRACTIVE STATE: CPT | Performed by: OPHTHALMOLOGY

## 2022-09-01 ASSESSMENT — SLIT LAMP EXAM - LIDS
COMMENTS: NORMAL
COMMENTS: NORMAL

## 2022-09-01 ASSESSMENT — REFRACTION_MANIFEST
OD_CYLINDER: SPHERE
OS_SPHERE: -0.25
OS_ADD: +2.00
OS_AXIS: 180
OD_SPHERE: PLANO
OD_ADD: +2.00
OS_CYLINDER: +0.50

## 2022-09-01 ASSESSMENT — EXTERNAL EXAM - LEFT EYE: OS_EXAM: NORMAL

## 2022-09-01 ASSESSMENT — CONF VISUAL FIELD
OS_NORMAL: 1
METHOD: COUNTING FINGERS
OD_NORMAL: 1

## 2022-09-01 ASSESSMENT — VISUAL ACUITY
OD_CC: J1+/-2
METHOD: SNELLEN - LINEAR
OD_SC: 20/20
OS_SC+: -2
OS_SC: 20/20
OS_CC: J1+/-2

## 2022-09-01 ASSESSMENT — CUP TO DISC RATIO
OS_RATIO: 0.2
OD_RATIO: 0.2

## 2022-09-01 ASSESSMENT — TONOMETRY
OS_IOP_MMHG: 12
IOP_METHOD: TONOPEN
OD_IOP_MMHG: 9

## 2022-09-01 ASSESSMENT — PACHYMETRY
OD_CT(UM): 442
OS_CT(UM): 451

## 2022-09-01 ASSESSMENT — REFRACTION_WEARINGRX
OD_SPHERE: +2.25
OS_CYLINDER: SPHERE
OD_CYLINDER: SPHERE
OS_SPHERE: +2.25

## 2022-09-01 ASSESSMENT — EXTERNAL EXAM - RIGHT EYE: OD_EXAM: NORMAL

## 2022-09-01 NOTE — PROGRESS NOTES
HPI  Jack Mello is a 57 year old male here for comprehensive eye exam.  Has mild blurry vision both eyes with current glasses.  Denies eye pain or irritation. Over the counter readers used and are working OK. Dryness in the morning but manages fine with lubricants PRN. Seasonally allergies usually improved with Zyrtec.     PMH:   Past Medical History:   Diagnosis Date     Anxiety 2009    associated with mom's passing away      POH: Glasses for myopia prior to Photorefractive keratectomy (PRK), status-post Photorefractive keratectomy (PRK) both eyes  12/14/15, enhancement left eye in 2019 (Dr. Walton)  , no other surgery, no trauma  Oc Meds: none  FH: Denies any glaucoma, age related macular degeneration, or other known eye diseases         Assessment & Plan      (H52.12) Myopia, left eye - Left Eye  (primary encounter diagnosis)  (H52.4) Presbyopia of both eyes - Both Eyes  (Z98.890) History of refractive surgery - Both Eyes  Comment: great visual acuity without correction after Photorefractive keratectomy (PRK)  Using readers for near  Plan: manifest refraction done and prescription for glasses given for bifocal as needed     (H04.123) Dry eyes, bilateral - Both Eyes  Comment: mild no cornea signs  Plan: artificial tear drops four times a day as needed , call if worsening    (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 or patient to call sooner as needed.      Complete documentation of historical and exam elements from today's encounter can be found in the full encounter summary report (not reduplicated in this progress note). I personally obtained the chief complaint(s) and history of present illness.  I  have confirmed and edited as necessary the CC, HPI, PMH/PSH, social history, FMH, ROS, and exam/neuro findings as obtained by the technician or others. I have examined this patient myself and I personally viewed the image(s) and studies listed above and the documentation reflects my findings and interpretation.  I formulated and edited as necessary the assessment and plan and discussed the findings and management plan with the patient and family.     Ana Marshall MD

## 2022-09-01 NOTE — NURSING NOTE
Chief Complaints and History of Present Illnesses   Patient presents with     COMPREHENSIVE EYE EXAM     Annual exam/ PRK each eye / PRK Enh left eye 2019     Chief Complaint(s) and History of Present Illness(es)     COMPREHENSIVE EYE EXAM     Laterality: both eyes    Comments: Annual exam/ PRK each eye / PRK Enh left eye 2019              Comments     Distance vision seem to be fine. Reading vision would like to verify readers.   OTC readers used and working OK.  Dryness in the morning but manages fine with lubricants PRN.     Jojo Carlos, COT COT 10:34 AM September 1, 2022

## 2023-01-14 ENCOUNTER — HEALTH MAINTENANCE LETTER (OUTPATIENT)
Age: 58
End: 2023-01-14

## 2023-02-09 ENCOUNTER — VIRTUAL VISIT (OUTPATIENT)
Dept: INTERNAL MEDICINE | Facility: CLINIC | Age: 58
End: 2023-02-09
Payer: COMMERCIAL

## 2023-02-09 ENCOUNTER — MYC MEDICAL ADVICE (OUTPATIENT)
Dept: INTERNAL MEDICINE | Facility: CLINIC | Age: 58
End: 2023-02-09

## 2023-02-09 ENCOUNTER — TELEPHONE (OUTPATIENT)
Dept: INTERNAL MEDICINE | Facility: CLINIC | Age: 58
End: 2023-02-09
Payer: COMMERCIAL

## 2023-02-09 DIAGNOSIS — U07.1 INFECTION DUE TO 2019 NOVEL CORONAVIRUS: Primary | ICD-10-CM

## 2023-02-09 PROCEDURE — 99213 OFFICE O/P EST LOW 20 MIN: CPT | Mod: VID | Performed by: NURSE PRACTITIONER

## 2023-02-09 NOTE — PROGRESS NOTES
S: Jack Mello is a 58 year old male           Patient Active Problem List   Diagnosis     Myopia            Past Medical History:   Diagnosis Date     Anxiety     associated with mom's passing away            Past Surgical History:   Procedure Laterality Date     COLONOSCOPY      Hemorrhoids, repeat in      LASIK       PHOTOREFRACTIVE KERATECTOMY UNILATERAL STANDARD OR CUSTOMVUE W OR W/O MITOMYCIN Right 2015    Procedure: PHOTOREFRACTIVE KERATECTOMY UNILATERAL STANDARD OR CUSTOMVUE W OR W/O MITOMYCIN;  Surgeon: Luis Daniel Walton MD;  Location: [facility]     PHOTOREFRACTIVE KERATECTOMY UNILATERAL STANDARD OR CUSTOMVUE W OR W/O MITOMYCIN Left 2015    Procedure: PHOTOREFRACTIVE KERATECTOMY UNILATERAL STANDARD OR CUSTOMVUE W OR W/O MITOMYCIN;  Surgeon: Luis Daniel Walton MD;  Location: [facility]     WAVESCAN SCREENING Bilateral 2015    Procedure: WAVESCAN SCREENING;  Surgeon: Luis Daniel Walton MD;  Location: [facility]            Social History     Tobacco Use     Smoking status: Never     Smokeless tobacco: Never   Substance Use Topics     Alcohol use: Yes     Comment: one per month            Family History   Problem Relation Age of Onset     Coronary Artery Disease Mother 69        s/p PI,  of cardiac arrest, s/p PCI/MARCUS, no brain function     Cancer Father 39        thyroid cancer     GI problems Brother      Cancer - colorectal No family hx of      Prostate Cancer No family hx of      Alcohol/Drug No family hx of      Glaucoma No family hx of      Macular Degeneration No family hx of                Allergies   Allergen Reactions     Seasonal Allergies Itching            Current Outpatient Medications   Medication Sig Dispense Refill     multivitamin w/minerals (THERA-VIT-M) tablet Take 1 tablet by mouth daily         REVIEW OF SYSTEMS:  See above.    O:   There were no vitals taken for this visit.  GENERAL APPEARANCE: healthy, alert and no distress  EYES: EOMI,  PERRL, sclera  "white, conjunctiva normal.  HENT: ear canals and TM's normal and mouth without ulcers or lesions  RESP: lungs clear to auscultation - no rales, rhonchi or wheezes  CV: regular rates and rhythm, normal S1 S2, no S3 or S4 and no murmur, click or rub   ABDOMEN:  soft, nontender, no HSM or masses and bowel sounds normal  NEURO: alert and oriented.  Good historian.  MUSK: ambulatory.  SKIN:  LYMPH: neg axillary, cervical, supra and infraclavicular nodes.  EXT: warm.  Edema {YES AMOUNT/NO:299439::\"NO\"}   PSYCHE:     The 10-year ASCVD risk score (Tristin MEANS, et al., 2019) is: 7.6%    Values used to calculate the score:      Age: 58 years      Sex: Male      Is Non- : No      Diabetic: No      Tobacco smoker: No      Systolic Blood Pressure: 118 mmHg      Is BP treated: No      HDL Cholesterol: 36 mg/dL      Total Cholesterol: 200 mg/dL    A/P:      The patient voiced understanding of the information discussed and all questions were answered.     I spent a total of      minutes in the care of this pt during today's office visit. This time includes reviewing the patient's chart and prior history, obtaining a history, performing an examination and evaluation and counseling the patient. This time also includes ordering medications or tests necessary in addition to communication to other member's of the patient's health care team. Time spent in documentation and care coordination is included.     Viki MARCUS, RITESH                  "

## 2023-02-09 NOTE — TELEPHONE ENCOUNTER
[facility]    Phone Message    May a detailed message be left on voicemail: yes     Reason for Call: Other: Patient Is calling the check the status of mychart encounter sent in this morning about Paxlovid. Please call the patient back as soon as possible      Action Taken: Message routed to:  [facility] ([facility]): pcp    Travel Screening: Not Applicable

## 2023-02-09 NOTE — PROGRESS NOTES
Jack Mello is a 58 year old male year old who is being evaluated via a billable video visit.      How would you like to obtain your AVS? MyChart  Will anyone else be joining your video visit? No      Video Start Time: 2:06      Subjective   Jack Mello who presents for the following health issues: tested  + for Covid 19 last evening. He has nasal congestion, cough, fever, headache and myalgias. Son and wile have Covid. Wife is being treated with Paxlovid. Pt desiring treatment with Paxlovid although he does not have risk factors.      Patient Active Problem List   Diagnosis     Myopia       Objective     Vitals: No vitals were obtained today due to virtual visit.    Physical Exam   RESP: No audible wheeze, cough, or visible cyanosis.  No visible retractions or increased work of breathing.    SKIN: Visible skin clear. No significant rash, abnormal pigmentation or lesions.  PSYCH: Mentation appears normal, affect normal/bright, judgement and insight intact, normal speech and appearance well-groomed.  NEURO: Mentation and speech appropriate for age.    Assessment and Plan  Positive test for Covid 19, pt desiring treatment with Paxlovid.    Video-Visit Details    Type of service:  Video Visit    Video End Time :2:14    Originating Location (pt. Location): Home    Distant Location (provider location):  [facility] [place]     Platform used for Video Visit: [employer]    Total time spent today with this patient including chart review, exam time with patient and documentation : 2:20pm    Viki MARCUS CNP

## 2023-12-03 ENCOUNTER — HEALTH MAINTENANCE LETTER (OUTPATIENT)
Age: 58
End: 2023-12-03

## 2024-04-02 ENCOUNTER — TELEPHONE (OUTPATIENT)
Dept: OPHTHALMOLOGY | Facility: CLINIC | Age: 59
End: 2024-04-02
Payer: COMMERCIAL

## 2024-04-02 NOTE — TELEPHONE ENCOUNTER
LVM for patient from the wait-list offering a sooner appointment. Provided direct number for scheduling options as appointment remains available.

## 2024-04-22 ENCOUNTER — OFFICE VISIT (OUTPATIENT)
Dept: OPHTHALMOLOGY | Facility: CLINIC | Age: 59
End: 2024-04-22
Payer: COMMERCIAL

## 2024-04-22 DIAGNOSIS — H52.203 MYOPIC ASTIGMATISM OF BOTH EYES: Primary | ICD-10-CM

## 2024-04-22 DIAGNOSIS — H52.4 PRESBYOPIA OF BOTH EYES: ICD-10-CM

## 2024-04-22 DIAGNOSIS — H04.123 DRY EYES, BILATERAL: ICD-10-CM

## 2024-04-22 DIAGNOSIS — H52.13 MYOPIC ASTIGMATISM OF BOTH EYES: Primary | ICD-10-CM

## 2024-04-22 DIAGNOSIS — H47.323 DRUSEN OF BOTH OPTIC DISCS: ICD-10-CM

## 2024-04-22 PROCEDURE — 92133 CPTRZD OPH DX IMG PST SGM ON: CPT | Performed by: OPHTHALMOLOGY

## 2024-04-22 PROCEDURE — 92014 COMPRE OPH EXAM EST PT 1/>: CPT | Performed by: OPHTHALMOLOGY

## 2024-04-22 PROCEDURE — 92015 DETERMINE REFRACTIVE STATE: CPT | Performed by: OPHTHALMOLOGY

## 2024-04-22 PROCEDURE — 92083 EXTENDED VISUAL FIELD XM: CPT | Performed by: OPHTHALMOLOGY

## 2024-04-22 ASSESSMENT — REFRACTION_WEARINGRX
OD_SPHERE: PLANO
OD_CYLINDER: SPHERE
OS_SPHERE: -0.25
OS_CYLINDER: +0.50
OS_ADD: +2.25
OS_AXIS: 180
OD_ADD: +2.25
SPECS_TYPE: PAL

## 2024-04-22 ASSESSMENT — CUP TO DISC RATIO
OD_RATIO: 0.2
OS_RATIO: 0.2

## 2024-04-22 ASSESSMENT — REFRACTION_MANIFEST
OD_SPHERE: -0.25
OS_CYLINDER: +0.50
OD_AXIS: 153
OS_AXIS: 017
OD_ADD: +2.25
OD_CYLINDER: +0.25
OS_SPHERE: -0.50
OS_ADD: +2.25

## 2024-04-22 ASSESSMENT — TONOMETRY
OS_IOP_MMHG: 14
OD_IOP_MMHG: 14
IOP_METHOD: TONOPEN

## 2024-04-22 ASSESSMENT — CONF VISUAL FIELD
OD_NORMAL: 1
METHOD: COUNTING FINGERS
OD_SUPERIOR_TEMPORAL_RESTRICTION: 0
OD_SUPERIOR_NASAL_RESTRICTION: 0
OD_INFERIOR_TEMPORAL_RESTRICTION: 0
OS_SUPERIOR_TEMPORAL_RESTRICTION: 0
OS_NORMAL: 1
OS_SUPERIOR_NASAL_RESTRICTION: 0
OS_INFERIOR_NASAL_RESTRICTION: 0
OS_INFERIOR_TEMPORAL_RESTRICTION: 0
OD_INFERIOR_NASAL_RESTRICTION: 0

## 2024-04-22 ASSESSMENT — PACHYMETRY
OS_CT(UM): 451
OD_CT(UM): 442

## 2024-04-22 ASSESSMENT — EXTERNAL EXAM - LEFT EYE: OS_EXAM: NORMAL

## 2024-04-22 ASSESSMENT — VISUAL ACUITY
OS_PH_SC: 20/20
OD_PH_SC: 20/15
OS_SC+: -2
OD_SC: 20/20
METHOD: SNELLEN - LINEAR
OD_SC+: +2
OS_SC: 20/25

## 2024-04-22 ASSESSMENT — SLIT LAMP EXAM - LIDS: COMMENTS: TR MGD

## 2024-04-22 ASSESSMENT — EXTERNAL EXAM - RIGHT EYE: OD_EXAM: NORMAL

## 2024-04-22 NOTE — NURSING NOTE
Chief Complaints and History of Present Illnesses   Patient presents with    Blurred Vision Evaluation     Chief Complaint(s) and History of Present Illness(es)       Blurred Vision Evaluation              Laterality: left eye    Associated symptoms: glare and haloes              Comments    Left eye vision seems cloudy distance and near over the last 6 months.  Notices more when does not have glasses on.  Denies pain.  Status post PRK left eye in 2019.  Is wondering if repeat treatment is warranted.  Denies diabetes.  Denies flashes or floaters.   Has had glare and halos around light due to astigmatism longstanding and continued after PRK.      Rosalba Rouse on 4/22/2024 at 9:13 AM

## 2024-04-22 NOTE — PROGRESS NOTES
HPI  Jack Mello is a 59 year old male here for comprehensive eye exam.   Blurry vision left eye mildly bothersome, mostly wears glasses for reading.   HPI       Blurred Vision Evaluation    In left eye.  Associated symptoms include glare and haloes.             Comments    Left eye vision seems cloudy distance and near over the last 6 months.  Notices more when does not have glasses on.  Denies pain.  Status post PRK left eye in 2019.  Is wondering if repeat treatment is warranted.  Denies diabetes.  Denies flashes or floaters.   Has had glare and halos around light due to astigmatism longstanding and continued after PRK.      Rosalba Rouse on 4/22/2024 at 9:13 AM            Last edited by Rosalba Rouse on 4/22/2024  9:13 AM.        PMH:   Past Medical History:   Diagnosis Date    Anxiety 2009    associated with mom's passing away      POH: Glasses for myopia prior to Photorefractive keratectomy (PRK), status-post Photorefractive keratectomy (PRK) both eyes  12/14/15, enhancement left eye in 2019 (Dr. Walton)  , no other surgery, no trauma, optic disc drusen both eyes   Oc Meds: none  FH: Denies any glaucoma, age related macular degeneration, or other known eye diseases         Assessment & Plan     1. Myopic astigmatism of both eyes - Both Eyes    2. Presbyopia of both eyes - Both Eyes    3. Dry eyes, bilateral - Both Eyes    4. 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      Myopic astigmatism both eyes   (H52.4) Presbyopia of both eyes - Both Eyes  Comment: good visual acuity without correction after Photorefractive keratectomy (PRK), mild myopic shift of >right eye but does correct to 20/15   Using readers for near  Plan: manifest refraction done and prescription for glasses given for bifocal update     (H04.123) Dry eyes, bilateral - Both Eyes  Comment: mild meibomian gland dysfunction- so likely evaporative dry eye -no cornea signs  Plan: artificial tear drops four times a day as needed , call if  worsening  Warm compresses at bedtime and breaks from computer     (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 or patient to call sooner as needed.      Complete documentation of historical and exam elements from today's encounter can be found in the full encounter summary report (not reduplicated in this progress note). I personally obtained the chief complaint(s) and history of present illness.  I have confirmed and edited as necessary the CC, HPI, PMH/PSH, social history, FMH, ROS, and exam/neuro findings as obtained by the technician or others. I have examined this patient myself and I personally viewed the image(s) and studies listed above and the documentation reflects my findings and interpretation.  I formulated and edited as necessary the assessment and plan and discussed the findings and management plan with the patient and family.     Ana Marshall MD

## 2024-05-31 SDOH — HEALTH STABILITY: PHYSICAL HEALTH: ON AVERAGE, HOW MANY MINUTES DO YOU ENGAGE IN EXERCISE AT THIS LEVEL?: 30 MIN

## 2024-05-31 SDOH — HEALTH STABILITY: PHYSICAL HEALTH: ON AVERAGE, HOW MANY DAYS PER WEEK DO YOU ENGAGE IN MODERATE TO STRENUOUS EXERCISE (LIKE A BRISK WALK)?: 3 DAYS

## 2024-05-31 ASSESSMENT — SOCIAL DETERMINANTS OF HEALTH (SDOH): HOW OFTEN DO YOU GET TOGETHER WITH FRIENDS OR RELATIVES?: ONCE A WEEK

## 2024-06-03 ENCOUNTER — OFFICE VISIT (OUTPATIENT)
Dept: INTERNAL MEDICINE | Facility: CLINIC | Age: 59
End: 2024-06-03
Payer: COMMERCIAL

## 2024-06-03 VITALS
BODY MASS INDEX: 25.37 KG/M2 | SYSTOLIC BLOOD PRESSURE: 129 MMHG | HEIGHT: 69 IN | WEIGHT: 171.3 LBS | HEART RATE: 75 BPM | DIASTOLIC BLOOD PRESSURE: 79 MMHG | OXYGEN SATURATION: 99 %

## 2024-06-03 DIAGNOSIS — G47.00 INSOMNIA, UNSPECIFIED TYPE: ICD-10-CM

## 2024-06-03 DIAGNOSIS — Z00.00 ROUTINE GENERAL MEDICAL EXAMINATION AT A HEALTH CARE FACILITY: Primary | ICD-10-CM

## 2024-06-03 PROCEDURE — 99396 PREV VISIT EST AGE 40-64: CPT | Performed by: PEDIATRICS

## 2024-06-03 NOTE — PROGRESS NOTES
"Preventive Care Visit  [facility]  Edi Sumner MD, Internal Medicine - Pediatrics  Stan[place] 3, 2024      Assessment & Plan     Routine general medical examination at a health care facility  Discussed his overall good health, encouraged him to maintain exercise/diet/weight, focus on core strength and adequate protein intake; Will check Hep B antibody status along with surveillance labs  - Hepatitis B Surface Antibody    Insomnia, unspecified type  Discussed at length, gave him phone number for a sleep psychologist to start with.  If not helpful, may try trazodone to see if this helps.        Patient has been advised of split billing requirements and indicates understanding: Yes    BMI  Estimated body mass index is 25.52 kg/m  as calculated from the following:    Height as of this encounter: 1.745 m (5' 8.7\").    Weight as of this encounter: 77.7 kg (171 lb 4.8 oz).       Counseling  Appropriate preventive services were discussed with this patient, including applicable screening as appropriate for fall prevention, nutrition, physical activity, Tobacco-use cessation, weight loss and cognition.  Checklist reviewing preventive services available has been given to the patient.  Reviewed patient's diet, addressing concerns and/or questions.   He is at risk for lack of exercise and has been provided with information to increase physical activity for the benefit of his well-being.       Work on weight loss  Regular exercise  See Patient Instructions    Return in about 53 weeks (around 6/9/2025) for Annual Wellness Visit.    Magno Santiago is a 59 year old, presenting for the following:  Physical        6/3/2024    12:20 PM   Additional Questions   Roomed by BLAKE, EMT        Health Care Directive  Patient does not have a Health Care Directive or Living Will: Discussed advance care planning with patient; information given to patient to review.    HPI  Has been doing generally " well, has had a good year of health.  One sinus infection, treated with antibiotics.  He works out several times per week, (3-4) and walks frequently.  He has spring allergies, takes Cetirizine which helps.          5/31/2024   General Health   How would you rate your overall physical health? Good   Feel stress (tense, anxious, or unable to sleep) Very much   (!) STRESS CONCERN      5/31/2024   Nutrition   Three or more servings of calcium each day? Yes   Diet: Regular (no restrictions)   How many servings of fruit and vegetables per day? 4 or more   How many sweetened beverages each day? (!) 2         5/31/2024   Exercise   Days per week of moderate/strenous exercise 3 days   Average minutes spent exercising at this level 30 min         5/31/2024   Social Factors   Frequency of gathering with friends or relatives Once a week   Worry food won't last until get money to buy more No   Food not last or not have enough money for food? No   Do you have housing?  Yes   Are you worried about losing your housing? No   Lack of transportation? No   Unable to get utilities (heat,electricity)? No         5/31/2024   Fall Risk   Fallen 2 or more times in the past year? No   Trouble with walking or balance? No          5/31/2024   Dental   Dentist two times every year? Yes         5/31/2024   TB Screening   Were you born outside of the [place]? Yes         Today's PHQ-2 Score:       6/3/2024    12:20 PM   PHQ-2 ( 1999 [employer])   Q1: Little interest or pleasure in doing things 0   Q2: Feeling down, depressed or hopeless 0   PHQ-2 Score 0   Q1: Little interest or pleasure in doing things Not at all   Q2: Feeling down, depressed or hopeless Not at all   PHQ-2 Score 0           5/31/2024   Substance Use   Alcohol more than 3/day or more than 7/wk No   Do you use any other substances recreationally? No     Social History     Tobacco Use    Smoking status: Never    Smokeless tobacco: Never   Substance Use Topics    Alcohol use: Yes      Comment: 2 drinks per month    Drug use: No           5/31/2024   STI Screening   New sexual partner(s) since last STI/HIV test? No   Last PSA:   PSA   Date Value Ref Range Status   12/07/2020 1.06 0 - 4 ug/L Final     Comment:     Assay Method:  Chemiluminescence using Siemens Vista analyzer     ASCVD Risk   The 10-year ASCVD risk score (Tristin MEANS, et al., 2019) is: 9.5%    Values used to calculate the score:      Age: 59 years      Sex: Male      Is Non- : No      Diabetic: No      Tobacco smoker: No      Systolic Blood Pressure: 129 mmHg      Is BP treated: No      HDL Cholesterol: 36 mg/dL      Total Cholesterol: 200 mg/dL         Reviewed and updated as needed this visit by Provider                    Past Medical History:   Diagnosis Date    Anxiety 01/01/2009    associated with mom's passing away     Past Surgical History:   Procedure Laterality Date    COLONOSCOPY  2015    Hemorrhoids, repeat in 2025    LASIK      PHOTOREFRACTIVE KERATECTOMY UNILATERAL STANDARD OR CUSTOMVUE W OR W/O MITOMYCIN Right 12/14/2015    Procedure: PHOTOREFRACTIVE KERATECTOMY UNILATERAL STANDARD OR CUSTOMVUE W OR W/O MITOMYCIN;  Surgeon: Luis Daniel Walton MD;  [facility]    PHOTOREFRACTIVE KERATECTOMY UNILATERAL STANDARD OR CUSTOMVUE W OR W/O MITOMYCIN Left 12/14/2015    Procedure: PHOTOREFRACTIVE KERATECTOMY UNILATERAL STANDARD OR CUSTOMVUE W OR W/O MITOMYCIN;  Surgeon: Luis Daniel Walton MD;  Location:  [facility]    WAVESCAN SCREENING Bilateral 12/14/2015    Procedure: WAVESCAN SCREENING;  Surgeon: Luis Daniel Walton MD;  Location:  [facility]     BP Readings from Last 3 Encounters:   06/03/24 129/79   08/29/22 118/79   05/05/22 123/74    Wt Readings from Last 3 Encounters:   06/03/24 77.7 kg (171 lb 4.8 oz)   09/01/22 77.1 kg (170 lb)   08/29/22 78.7 kg (173 lb 9.6 oz)              Review of Systems  Constitutional, neuro, ENT, endocrine, pulmonary, cardiac, gastrointestinal, genitourinary,  "musculoskeletal, integument and psychiatric systems are negative, except as otherwise noted.     Objective    Exam  /79 (BP Location: Right arm, Patient Position: Sitting, Cuff Size: Adult Regular)   Pulse 75   Ht 1.745 m (5' 8.7\")   Wt 77.7 kg (171 lb 4.8 oz)   SpO2 99%   BMI 25.52 kg/m     Estimated body mass index is 25.52 kg/m  as calculated from the following:    Height as of this encounter: 1.745 m (5' 8.7\").    Weight as of this encounter: 77.7 kg (171 lb 4.8 oz).    Physical Exam  GENERAL: alert and no distress  EYES: Eyes grossly normal to inspection, PERRL and conjunctivae and sclerae normal  HENT: ear canals and TM's normal, nose and mouth without ulcers or lesions  NECK: no adenopathy, no asymmetry, masses, or scars  RESP: lungs clear to auscultation - no rales, rhonchi or wheezes  CV: regular rate and rhythm, normal S1 S2, no S3 or S4, no murmur, click or rub, no peripheral edema  ABDOMEN: soft, nontender, no hepatosplenomegaly, no masses and bowel sounds normal  MS: no gross musculoskeletal defects noted, no edema  SKIN: no suspicious lesions or rashes  NEURO: Normal strength and tone, mentation intact and speech normal  PSYCH: mentation appears normal, affect normal/bright        Signed Electronically by: Edi Sumner MD    "

## 2024-06-03 NOTE — PATIENT INSTRUCTIONS
"Sleep Psychologist:  José Miguel Tiwari, PhD  - [phone]  [address] [facility] [address] [place], [address]  [place], [place] [address]    Preventive Care Advice   This is general advice we often give to help people stay healthy. Your care team may have specific advice just for you. Please talk to your care team about your own preventive care needs.  Lifestyle  Exercise at least 150 minutes each week (30 minutes a day, 5 days a week).  Do muscle strengthening activities 2 days a week. These help control your weight and prevent disease.  No smoking.  Wear sunscreen to prevent skin cancer.  Have your home tested for radon every 2 to 5 years. Radon is a colorless, odorless gas that can harm your lungs. To learn more, go to www.health.[facility].mn.us and search for \"Radon in Homes.\"  Keep guns unloaded and locked up in a safe place like a safe or gun vault, or, use a gun lock and hide the keys. Always lock away bullets separately. To learn more, visit [employer].[place].gov and search for \"safe gun storage.\"  Nutrition  Eat 5 or more servings of fruits and vegetables each day.  Try wheat bread, brown rice and whole grain pasta (instead of white bread, rice, and pasta).  Get enough calcium and vitamin D. Check the label on foods and aim for 100% of the RDA (recommended daily allowance).  Regular exams  Have a dental exam and cleaning every 6 months.  See your health care team every year to talk about:  Any changes in your health.  Any medicines your care team has prescribed.  Preventive care, family planning, and ways to prevent chronic diseases.  Shots (vaccines)   HPV shots (up to age 26), if you've never had them before.  Hepatitis B shots (up to age 59), if you've never had them before.  COVID-19 shot: Get this shot when it's due.  Flu shot: Get a flu shot every year.  Tetanus shot: Get a tetanus shot every 10 years.  Pneumococcal, hepatitis A, and RSV shots: Ask your care team if you need these based on your risk.  Shingles shot (for " age 50 and up).  General health tests  Diabetes screening:  Starting at age 35, Get screened for diabetes at least every 3 years.  If you are younger than age 35, ask your care team if you should be screened for diabetes.  Cholesterol test: At age 39, start having a cholesterol test every 5 years, or more often if advised.  Bone density scan (DEXA): At age 50, ask your care team if you should have this scan for osteoporosis (brittle bones).  Hepatitis C: Get tested at least once in your life.  Abdominal aortic aneurysm screening: Talk to your doctor about having this screening if you:  Have ever smoked; and  Are biologically male; and  Are between the ages of 65 and 75.  STIs (sexually transmitted infections)  Before age 24: Ask your care team if you should be screened for STIs.  After age 24: Get screened for STIs if you're at risk. You are at risk for STIs (including HIV) if:  You are sexually active with more than one person.  You don't use condoms every time.  You or a partner was diagnosed with a sexually transmitted infection.  If you are at risk for HIV, ask about PrEP medicine to prevent HIV.  Get tested for HIV at least once in your life, whether you are at risk for HIV or not.  Cancer screening tests  Cervical cancer screening: If you have a cervix, begin getting regular cervical cancer screening tests at age 21. Most people who have regular screenings with normal results can stop after age 65. Talk about this with your provider.  Breast cancer scan (mammogram): If you've ever had breasts, begin having regular mammograms starting at age 40. This is a scan to check for breast cancer.  Colon cancer screening: It is important to start screening for colon cancer at age 45.  Have a colonoscopy test every 10 years (or more often if you're at risk) Or, ask your provider about stool tests like a FIT test every year or Cologuard test every 3 years.  To learn more about your testing options, visit:  www.[employer]/858635.pdf.  For help making a decision, visit: steven.francis/bv75216.  Prostate cancer screening test: If you have a prostate and are age 55 to 69, ask your provider if you would benefit from a yearly prostate cancer screening test.  Lung cancer screening: If you are a current or former smoker age 50 to 80, ask your care team if ongoing lung cancer screenings are right for you.  For informational purposes only. Not to replace the advice of your health care provider. Copyright   2023 [facility] [employer]. All rights reserved. Clinically reviewed by the [facility]. [employer] 613289 - REV 04/24.    Learning About Stress  What is stress?     Stress is your body's response to a hard situation. Your body can have a physical, emotional, or mental response. Stress is a fact of life for most people, and it affects everyone differently. What causes stress for you may not be stressful for someone else.  A lot of things can cause stress. You may feel stress when you go on a job interview, take a test, or run a race. This kind of short-term stress is normal and even useful. It can help you if you need to work hard or react quickly. For example, stress can help you finish an important job on time.  Long-term stress is caused by ongoing stressful situations or events. Examples of long-term stress include long-term health problems, ongoing problems at work, or conflicts in your family. Long-term stress can harm your health.  How does stress affect your health?  When you are stressed, your body responds as though you are in danger. It makes hormones that speed up your heart, make you breathe faster, and give you a burst of energy. This is called the fight-or-flight stress response. If the stress is over quickly, your body goes back to normal and no harm is done.  But if stress happens too often or lasts too long, it can have bad effects. Long-term stress can make you more likely to get sick,  and it can make symptoms of some diseases worse. If you tense up when you are stressed, you may develop neck, shoulder, or low back pain. Stress is linked to high blood pressure and heart disease.  Stress also harms your emotional health. It can make you wise, tense, or depressed. Your relationships may suffer, and you may not do well at work or school.  What can you do to manage stress?  You can try these things to help manage stress:   Do something active. Exercise or activity can help reduce stress. Walking is a great way to get started. Even everyday activities such as housecleaning or yard work can help.  Try yoga or walter chi. These techniques combine exercise and meditation. You may need some training at first to learn them.  Do something you enjoy. For example, listen to music or go to a movie. Practice your hobby or do volunteer work.  Meditate. This can help you relax, because you are not worrying about what happened before or what may happen in the future.  Do guided imagery. Imagine yourself in any setting that helps you feel calm. You can use online videos, books, or a teacher to guide you.  Do breathing exercises. For example:  From a standing position, bend forward from the waist with your knees slightly bent. Let your arms dangle close to the floor.  Breathe in slowly and deeply as you return to a standing position. Roll up slowly and lift your head last.  Hold your breath for just a few seconds in the standing position.  Breathe out slowly and bend forward from the waist.  Let your feelings out. Talk, laugh, cry, and express anger when you need to. Talking with supportive friends or family, a counselor, or a jennifer leader about your feelings is a healthy way to relieve stress. Avoid discussing your feelings with people who make you feel worse.  Write. It may help to write about things that are bothering you. This helps you find out how much stress you feel and what is causing it. When you know this,  "you can find better ways to cope.  What can you do to prevent stress?  You might try some of these things to help prevent stress:  Manage your time. This helps you find time to do the things you want and need to do.  Get enough sleep. Your body recovers from the stresses of the day while you are sleeping.  Get support. Your family, friends, and community can make a difference in how you experience stress.  Limit your news feed. Avoid or limit time on social media or news that may make you feel stressed.  Do something active. Exercise or activity can help reduce stress. Walking is a great way to get started.  Where can you learn more?  Go to https://www.[employer].net/patiented  Enter N032 in the search box to learn more about \"Learning About Stress.\"  Current as of: October 24, 2023               Content Version: 14.0    9675-9745 [employer].   Care instructions adapted under license by your healthcare professional. If you have questions about a medical condition or this instruction, always ask your healthcare professional. [employer] disclaims any warranty or liability for your use of this information.      "

## 2024-06-04 ENCOUNTER — LAB (OUTPATIENT)
Dept: LAB | Facility: CLINIC | Age: 59
End: 2024-06-04
Payer: COMMERCIAL

## 2024-06-04 DIAGNOSIS — Z00.00 ROUTINE GENERAL MEDICAL EXAMINATION AT A HEALTH CARE FACILITY: ICD-10-CM

## 2024-06-04 PROCEDURE — 80048 BASIC METABOLIC PNL TOTAL CA: CPT

## 2024-06-04 PROCEDURE — G0103 PSA SCREENING: HCPCS

## 2024-06-04 PROCEDURE — 86706 HEP B SURFACE ANTIBODY: CPT

## 2024-06-04 PROCEDURE — 80061 LIPID PANEL: CPT

## 2024-06-04 PROCEDURE — 36415 COLL VENOUS BLD VENIPUNCTURE: CPT

## 2024-06-05 LAB
ANION GAP SERPL CALCULATED.3IONS-SCNC: 10 MMOL/L (ref 7–15)
BUN SERPL-MCNC: 15.8 MG/DL (ref 6–20)
CALCIUM SERPL-MCNC: 9.4 MG/DL (ref 8.6–10)
CHLORIDE SERPL-SCNC: 105 MMOL/L (ref 98–107)
CHOLEST SERPL-MCNC: 213 MG/DL
CREAT SERPL-MCNC: 0.98 MG/DL (ref 0.67–1.17)
DEPRECATED HCO3 PLAS-SCNC: 25 MMOL/L (ref 22–29)
EGFRCR SERPLBLD CKD-EPI 2021: 89 ML/MIN/1.73M2
FASTING STATUS PATIENT QL REPORTED: YES
FASTING STATUS PATIENT QL REPORTED: YES
GLUCOSE SERPL-MCNC: 110 MG/DL (ref 70–99)
HBV SURFACE AB SERPL IA-ACNC: <3.5 M[IU]/ML
HBV SURFACE AB SERPL IA-ACNC: NONREACTIVE M[IU]/ML
HDLC SERPL-MCNC: 40 MG/DL
LDLC SERPL CALC-MCNC: 150 MG/DL
NONHDLC SERPL-MCNC: 173 MG/DL
POTASSIUM SERPL-SCNC: 4.8 MMOL/L (ref 3.4–5.3)
PSA SERPL DL<=0.01 NG/ML-MCNC: 0.96 NG/ML (ref 0–3.5)
SODIUM SERPL-SCNC: 140 MMOL/L (ref 135–145)
TRIGL SERPL-MCNC: 116 MG/DL

## 2024-06-10 ENCOUNTER — MYC MEDICAL ADVICE (OUTPATIENT)
Dept: INTERNAL MEDICINE | Facility: CLINIC | Age: 59
End: 2024-06-10
Payer: COMMERCIAL

## 2024-12-04 ENCOUNTER — TELEPHONE (OUTPATIENT)
Dept: INTERNAL MEDICINE | Facility: CLINIC | Age: 59
End: 2024-12-04
Payer: COMMERCIAL

## 2024-12-04 NOTE — TELEPHONE ENCOUNTER
Patient confirmed scheduled appointment:  Date: June 16th   Time: 12:30pm  Visit type: Physical   Provider: PCP  [place] [facility]  Additional notes: per check out - Return in about 53 weeks (around 6/9/2025) for Annual Wellness Visit.

## 2025-04-21 ENCOUNTER — MYC MEDICAL ADVICE (OUTPATIENT)
Dept: INTERNAL MEDICINE | Facility: CLINIC | Age: 60
End: 2025-04-21
Payer: COMMERCIAL

## 2025-04-21 DIAGNOSIS — Z12.11 SCREEN FOR COLON CANCER: ICD-10-CM

## 2025-04-22 ENCOUNTER — TELEPHONE (OUTPATIENT)
Dept: GASTROENTEROLOGY | Facility: CLINIC | Age: 60
End: 2025-04-22
Payer: COMMERCIAL

## 2025-04-22 ENCOUNTER — HOSPITAL ENCOUNTER (OUTPATIENT)
Facility: AMBULATORY SURGERY CENTER | Age: 60
End: 2025-04-22
Attending: INTERNAL MEDICINE
Payer: COMMERCIAL

## 2025-04-22 NOTE — TELEPHONE ENCOUNTER
"Endoscopy Scheduling Screen    Caller: patient    Have you had any respiratory illness or flu-like symptoms in the last 10 days?  No    What is your communication preference for Instructions and/or Bowel Prep?   MyChart    What insurance is in the chart?  Other:  BCBS    Ordering/Referring Provider: Burak   (If ordering provider performs procedure, schedule with ordering provider unless otherwise instructed. )    BMI: Estimated body mass index is 25.52 kg/m  as calculated from the following:    Height as of 6/3/24: 1.745 m (5' 8.7\").    Weight as of 6/3/24: 77.7 kg (171 lb 4.8 oz).     Sedation Ordered  moderate sedation.   If patient BMI > 50 do not schedule in [facility].    If patient BMI > 45 do not schedule at [facility].    Are you taking methadone or Suboxone?  NO, No RN review required.    Have you been diagnosed and are being treated for severe PTSD or severe anxiety?  NO, No RN review required.    Are you taking any prescription medications for pain 3 or more times per week?   NO, No RN review required.    Do you have a history of malignant hyperthermia?  No    (Females) Are you currently pregnant?        Have you been diagnosed or told you have pulmonary hypertension?   No    Do you have an LVAD?  No    Have you been told you have moderate to severe sleep apnea?  No.    Have you been told you have COPD, asthma, or any other lung disease?  No    Has your doctor ordered any cardiac tests like echo, angiogram, stress test, ablation, or EKG, that you have not completed yet?  No    Do you  have a history of any heart conditions?  No     Have you ever had or are you waiting for an organ transplant?  No. Continue scheduling, no site restrictions.    Have you had a stroke or transient ischemic attack (TIA aka \"mini stroke\") in the last 2 years?   No.    Have you been diagnosed with or been told you have cirrhosis of the liver?   No.    Are you currently on dialysis?   No    Do you need assistance transferring?   No    BMI: " "Estimated body mass index is 25.52 kg/m  as calculated from the following:    Height as of 6/3/24: 1.745 m (5' 8.7\").    Weight as of 6/3/24: 77.7 kg (171 lb 4.8 oz).     Is patients BMI > 40 and scheduling location [facility]?  No    Do you take an injectable or oral medication for weight loss or diabetes (excluding insulin)?  No    Do you take the medication Naltrexone?  No    Do you take blood thinners?  No       Prep   Are you currently on dialysis or do you have chronic kidney disease?  No    Do you have a diagnosis of diabetes?  No    Do you have a diagnosis of cystic fibrosis (CF)?  No    On a regular basis do you go 3 -5 days between bowel movements?  No    BMI > 40?  No    Preferred Pharmacy:    [id number] [address] [employer]  [address] [place] [id number]  Phone: [phone] Fax: [phone]      Final Scheduling Details     Procedure scheduled  Colonoscopy    Surgeon:       Date of procedure:  6/12/25     Pre-OP / PAC:   No - Not required for this site.    Location  [facility] - [facility] - Patient preference.    Sedation   Moderate Sedation - Per order.      Patient Reminders:   You will receive a call from a Nurse to review instructions and health history.  This assessment must be completed prior to your procedure.  Failure to complete the Nurse assessment may result in the procedure being cancelled.      On the day of your procedure, please designate an adult(s) who can drive you home stay with you for the next 24 hours. The medicines used in the exam will make you sleepy. You will not be able to drive.      You cannot take public transportation, ride share services, or non-medical taxi service without a responsible caregiver.  Medical transport services are allowed with the requirement that a responsible caregiver will receive you at your destination.  We require that drivers and caregivers are confirmed prior to your procedure.   "

## 2025-04-28 ENCOUNTER — OFFICE VISIT (OUTPATIENT)
Dept: OPHTHALMOLOGY | Facility: CLINIC | Age: 60
End: 2025-04-28
Payer: COMMERCIAL

## 2025-04-28 DIAGNOSIS — H04.123 DRY EYES, BILATERAL: ICD-10-CM

## 2025-04-28 DIAGNOSIS — H52.4 PRESBYOPIA OF BOTH EYES: ICD-10-CM

## 2025-04-28 DIAGNOSIS — H52.202 MYOPIC ASTIGMATISM OF LEFT EYE: Primary | ICD-10-CM

## 2025-04-28 DIAGNOSIS — H47.323 DRUSEN OF BOTH OPTIC DISCS: ICD-10-CM

## 2025-04-28 DIAGNOSIS — H52.12 MYOPIC ASTIGMATISM OF LEFT EYE: Primary | ICD-10-CM

## 2025-04-28 LAB
OPHTH MEAN DEVIATION LEFT EYE: -1.7 DB
OPHTH MEAN DEVIATION RIGHT EYE: -0.8 DB

## 2025-04-28 PROCEDURE — 92083 EXTENDED VISUAL FIELD XM: CPT | Performed by: OPHTHALMOLOGY

## 2025-04-28 PROCEDURE — 99214 OFFICE O/P EST MOD 30 MIN: CPT | Performed by: OPHTHALMOLOGY

## 2025-04-28 PROCEDURE — 92015 DETERMINE REFRACTIVE STATE: CPT | Performed by: OPHTHALMOLOGY

## 2025-04-28 PROCEDURE — 92133 CPTRZD OPH DX IMG PST SGM ON: CPT | Performed by: OPHTHALMOLOGY

## 2025-04-28 ASSESSMENT — REFRACTION_WEARINGRX
OD_SPHERE: +1.50
OD_CYLINDER: SPHERE
OS_SPHERE: +1.50
OS_CYLINDER: SPHERE

## 2025-04-28 ASSESSMENT — CONF VISUAL FIELD
OD_NORMAL: 1
OD_INFERIOR_NASAL_RESTRICTION: 0
OS_SUPERIOR_NASAL_RESTRICTION: 0
OS_SUPERIOR_TEMPORAL_RESTRICTION: 0
OD_SUPERIOR_NASAL_RESTRICTION: 0
OD_SUPERIOR_TEMPORAL_RESTRICTION: 0
OS_INFERIOR_NASAL_RESTRICTION: 0
OS_INFERIOR_TEMPORAL_RESTRICTION: 0
OS_NORMAL: 1
OD_INFERIOR_TEMPORAL_RESTRICTION: 0
METHOD: COUNTING FINGERS

## 2025-04-28 ASSESSMENT — SLIT LAMP EXAM - LIDS: COMMENTS: TR MGD

## 2025-04-28 ASSESSMENT — REFRACTION_MANIFEST
OS_AXIS: 010
OS_CYLINDER: +0.75
OD_AXIS: 152
OD_CYLINDER: +0.25
OD_ADD: +2.00
OS_ADD: +2.00
OD_SPHERE: PLANO
OS_SPHERE: -0.75

## 2025-04-28 ASSESSMENT — PACHYMETRY
OS_CT(UM): 451
OD_CT(UM): 442

## 2025-04-28 ASSESSMENT — VISUAL ACUITY
OS_PH_SC: 20/15
OD_SC: 20/15
OS_CC: J1
OD_CC: J2
OD_SC+: -2
OS_PH_SC+: -3
METHOD: SNELLEN - LINEAR
OS_SC: 20/25
OS_SC+: -1

## 2025-04-28 ASSESSMENT — CUP TO DISC RATIO
OS_RATIO: 0.2
OD_RATIO: 0.2

## 2025-04-28 ASSESSMENT — TONOMETRY
IOP_METHOD: TONOPEN
OD_IOP_MMHG: 13
OS_IOP_MMHG: 11

## 2025-04-28 ASSESSMENT — EXTERNAL EXAM - RIGHT EYE: OD_EXAM: NORMAL

## 2025-04-28 ASSESSMENT — EXTERNAL EXAM - LEFT EYE: OS_EXAM: NORMAL

## 2025-04-28 NOTE — NURSING NOTE
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   Using the OTC readers yet.   Feel may need to get Rx reader this year with left eye less than right eye with NVA that seem to have declined more since last visit.   Would like to know if enhancement is indicated again for left eye.  History with PRK each eye. Enhansed left eye 1 time previously as well.     Jojo Carlos, COT COT 10:58 AM April 28, 2025

## 2025-04-28 NOTE — PROGRESS NOTES
HPI  Jack Mello is a 60 year old male here for comprehensive eye exam.   Blurry vision left eye mildly bothersome, mostly wears glasses for reading, over-the-counter only.    HPI       COMPREHENSIVE EYE EXAM    In both eyes.  Treatments tried include artificial tears.  Pain was noted as 0/10. 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   Using the OTC readers yet.   Feel may need to get Rx reader this year with left eye less than right eye with NVA that seem to have declined more since last visit.   Would like to know if enhancement is indicated again for left eye.  History with PRK each eye. Enhansed left eye 1 time previously as well.     STEFANY Mazariegos COT 10:58 AM April 28, 2025                   Last edited by Jojo Carlos COT on 4/28/2025 10:58 AM.        PMH:   Past Medical History:   Diagnosis Date    Anxiety 01/01/2009    associated with mom's passing away      POH: Glasses for myopia prior to Photorefractive keratectomy (PRK), status-post Photorefractive keratectomy (PRK) both eyes  12/14/15, enhancement left eye in 2019 (Dr. Walton)  , no other surgery, no trauma, optic disc drusen both eyes   Oc Meds: none  FH: Denies any glaucoma, age related macular degeneration, or other known eye diseases         Assessment & Plan     1. 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 Presbyopia of both eyes - Both Eyes    3. Dry eyes, bilateral - Both Eyes    4. 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        Myopic astigmatism left eye  (H52.4) Presbyopia of both eyes - Both Eyes  Comment: good visual acuity without correction after Photorefractive keratectomy (PRK), mild myopic shift of left eye>right eye but does correct to 20/15   Using readers for near  Plan: manifest refraction done and prescription for glasses given for bifocal update or prescription readers  If  seeing well with over-the-counter readers okay to continue just with those  Optional refractive consult for retreatment of left eye    H04.123) Dry eyes, bilateral - Both Eyes  Comment: mild meibomian gland dysfunction- so likely evaporative dry eye -no cornea signs today  Mostly asymptomatic  Plan: artificial tear drops four times a day as needed , call if worsening  Continue to take breaks from prolonged computer use or near work    (H47.323) 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 or patient to call sooner as needed.      Complete documentation of historical and exam elements from today's encounter can be found in the full encounter summary report (not reduplicated in this progress note). I personally obtained the chief complaint(s) and history of present illness.  I have confirmed and edited as necessary the CC, HPI, PMH/PSH, social history, FMH, ROS, and exam/neuro findings as obtained by the technician or others. I have examined this patient myself and I personally viewed the image(s) and studies listed above and the documentation reflects my findings and interpretation.  I formulated and edited as necessary the assessment and plan and discussed the findings and management plan with the patient and family.     Ana Marshall MD

## 2025-06-03 ENCOUNTER — HOSPITAL ENCOUNTER (OUTPATIENT)
Facility: CLINIC | Age: 60
Discharge: HOME | End: 2025-06-03
Attending: COLON & RECTAL SURGERY | Admitting: COLON & RECTAL SURGERY
Payer: COMMERCIAL

## 2025-06-03 VITALS
HEART RATE: 76 BPM | WEIGHT: 170 LBS | HEIGHT: 69 IN | DIASTOLIC BLOOD PRESSURE: 95 MMHG | SYSTOLIC BLOOD PRESSURE: 123 MMHG | RESPIRATION RATE: 17 BRPM | OXYGEN SATURATION: 97 % | BODY MASS INDEX: 25.18 KG/M2

## 2025-06-03 LAB — COLONOSCOPY: NORMAL

## 2025-06-03 PROCEDURE — G0121 COLON CA SCRN NOT HI RSK IND: HCPCS | Performed by: COLON & RECTAL SURGERY

## 2025-06-03 PROCEDURE — G0500 MOD SEDAT ENDO SERVICE >5YRS: HCPCS | Performed by: COLON & RECTAL SURGERY

## 2025-06-03 PROCEDURE — 250N000011 HC RX IP 250 OP 636: Performed by: COLON & RECTAL SURGERY

## 2025-06-03 RX ORDER — ONDANSETRON 2 MG/ML
INJECTION INTRAMUSCULAR; INTRAVENOUS PRN
Status: DISCONTINUED | OUTPATIENT
Start: 2025-06-03 | End: 2025-06-03 | Stop reason: HOSPADM

## 2025-06-03 RX ORDER — LIDOCAINE 40 MG/G
CREAM TOPICAL
Status: DISCONTINUED | OUTPATIENT
Start: 2025-06-03 | End: 2025-06-03 | Stop reason: HOSPADM

## 2025-06-03 RX ORDER — FENTANYL CITRATE 50 UG/ML
INJECTION, SOLUTION INTRAMUSCULAR; INTRAVENOUS PRN
Status: DISCONTINUED | OUTPATIENT
Start: 2025-06-03 | End: 2025-06-03 | Stop reason: HOSPADM

## 2025-06-03 RX ORDER — ONDANSETRON 2 MG/ML
4 INJECTION INTRAMUSCULAR; INTRAVENOUS
Status: DISCONTINUED | OUTPATIENT
Start: 2025-06-03 | End: 2025-06-03 | Stop reason: HOSPADM

## 2025-06-03 ASSESSMENT — ACTIVITIES OF DAILY LIVING (ADL)
ADLS_ACUITY_SCORE: 41
ADLS_ACUITY_SCORE: 41

## 2025-06-03 NOTE — H&P
Colon & Rectal Surgery History and Physical  Pre-Endoscopy Procedure Note    History of Present Illness   I have been asked by Dr. Sumner to evaluate this 60 year old male for colorectal cancer screening. He currently denies any abdominal pain, weight loss, bleeding per rectum, or recent change in bowel habits.    Past Medical History  Diagnosis Date    Anxiety (situational) 01/01/2009       Past Surgical History  Procedure Laterality Date    LASIK      PHOTOREFRACTIVE KERATECTOMY UNILATERAL STANDARD OR CUSTOMVUE W OR W/O MITOMYCIN Right 12/14/2015    Procedure: PHOTOREFRACTIVE KERATECTOMY UNILATERAL STANDARD OR CUSTOMVUE W OR W/O MITOMYCIN;  Surgeon: Luis Daniel Walton MD;  Location:  [facility]    PHOTOREFRACTIVE KERATECTOMY UNILATERAL STANDARD OR CUSTOMVUE W OR W/O MITOMYCIN Left 12/14/2015    Procedure: PHOTOREFRACTIVE KERATECTOMY UNILATERAL STANDARD OR CUSTOMVUE W OR W/O MITOMYCIN;  Surgeon: Luis Daniel Walton MD;  Location:  [facility]    WAVESCAN SCREENING Bilateral 12/14/2015    Procedure: WAVESCAN SCREENING;  Surgeon: Luis Daniel Walton MD;  Location: [facility]        Medications  Medications Prior to Admission   Medication Sig    multivitamin w/minerals (THERA-VIT-M) tablet Take 1 tablet by mouth daily       Allergies  Allergen Reactions    Seasonal Allergies Itching        Family History   Family history includes Breast Cancer in an other family member; Cancer (age of onset: 39) in his father; Coronary Artery Disease (age of onset: 69) in his mother; GI problems in his brother; Other Cancer in his father; Thyroid Disease in his father.     Social History   He reports that he has never smoked. He has never used smokeless tobacco. He reports current alcohol use. He reports that he does not use drugs.    Review of Systems   Constitutional:  No fever, weight change or fatigue.    Eyes:     No dry eyes or vision changes.   Ears/Nose/Throat/Neck:  No oral ulcers, sore throat or voice change.    Cardiovascular:   No  "palpitations, syncope, angina or edema.   Respiratory:    No chest pain, excessive sleepiness, shortness of breath or hemoptysis.    Gastrointestinal:   No abdominal pain, nausea, vomiting, diarrhea or heartburn.    Genitourinary:   No dysuria, hematuria, urinary retention or urinary frequency.   Musculoskeletal:  No joint swelling or arthralgias.    Dermatologic:  No skin rash or other skin changes.   Neurologic:    No focal weakness or numbness. No neuropathy.   Psychiatric:    No depression, anxiety, suicidal ideation, or paranoid ideation.   Endocrine:   No cold or heat intolerance, polydipsia, hirsutism, change in libido, or flushing.   Hematology/Lymphatic:  No bleeding or lymphadenopathy.    Allergy/Immunology:  No rhinitis or hives.     Physical Exam   Vitals:  /89, RR 14, HR 64, height 1.753 m (5' 9\"), weight 77.1 kg (170 lb), SpO2 100%.    General:  Alert and oriented to person, place and time   Airway: Normal oropharyngeal airway and neck mobility   Lungs:  Clear bilaterally   Heart:  Regular rate and rhythm   Abdomen: Soft, NT, ND, no masses   Extremities: Warm, good capillary refill    ASA Grade: I (normal healthy patient)      Impression: Cleared for use of conscious sedation for colorectal cancer screening    Plan: Proceed with colonoscopy     Marleen Hernandez MD  [facility]  [phone]  "

## 2025-07-13 ENCOUNTER — HEALTH MAINTENANCE LETTER (OUTPATIENT)
Age: 60
End: 2025-07-13

## 2025-08-12 ENCOUNTER — TRANSFERRED RECORDS (OUTPATIENT)
Dept: HEALTH INFORMATION MANAGEMENT | Facility: CLINIC | Age: 60
End: 2025-08-12

## 2025-08-12 ENCOUNTER — LAB (OUTPATIENT)
Dept: LAB | Facility: CLINIC | Age: 60
End: 2025-08-12
Payer: COMMERCIAL

## 2025-08-12 ENCOUNTER — OFFICE VISIT (OUTPATIENT)
Dept: INTERNAL MEDICINE | Facility: CLINIC | Age: 60
End: 2025-08-12
Payer: COMMERCIAL

## 2025-08-12 VITALS
OXYGEN SATURATION: 96 % | HEIGHT: 69 IN | RESPIRATION RATE: 16 BRPM | WEIGHT: 174.1 LBS | TEMPERATURE: 98.1 F | HEART RATE: 82 BPM | SYSTOLIC BLOOD PRESSURE: 124 MMHG | DIASTOLIC BLOOD PRESSURE: 84 MMHG | BODY MASS INDEX: 25.79 KG/M2

## 2025-08-12 DIAGNOSIS — S46.219A BICEPS TENDON TEAR: ICD-10-CM

## 2025-08-12 DIAGNOSIS — Z01.818 PREOP GENERAL PHYSICAL EXAM: ICD-10-CM

## 2025-08-12 DIAGNOSIS — Z01.818 PREOP GENERAL PHYSICAL EXAM: Primary | ICD-10-CM

## 2025-08-12 LAB
ANION GAP SERPL CALCULATED.3IONS-SCNC: 12 MMOL/L (ref 7–15)
BUN SERPL-MCNC: 11.4 MG/DL (ref 8–23)
CALCIUM SERPL-MCNC: 9.5 MG/DL (ref 8.8–10.4)
CHLORIDE SERPL-SCNC: 103 MMOL/L (ref 98–107)
CREAT SERPL-MCNC: 0.89 MG/DL (ref 0.67–1.17)
EGFRCR SERPLBLD CKD-EPI 2021: >90 ML/MIN/1.73M2
ERYTHROCYTE [DISTWIDTH] IN BLOOD BY AUTOMATED COUNT: 12.7 % (ref 10–15)
GLUCOSE SERPL-MCNC: 105 MG/DL (ref 70–99)
HCO3 SERPL-SCNC: 25 MMOL/L (ref 22–29)
HCT VFR BLD AUTO: 45.3 % (ref 40–53)
HGB BLD-MCNC: 15.4 G/DL (ref 13.3–17.7)
MCH RBC QN AUTO: 30.4 PG (ref 26.5–33)
MCHC RBC AUTO-ENTMCNC: 34 G/DL (ref 31.5–36.5)
MCV RBC AUTO: 89.5 FL (ref 78–100)
PLATELET # BLD AUTO: 211 10E3/UL (ref 150–450)
POTASSIUM SERPL-SCNC: 4.3 MMOL/L (ref 3.4–5.3)
RBC # BLD AUTO: 5.06 10E6/UL (ref 4.4–5.9)
SODIUM SERPL-SCNC: 140 MMOL/L (ref 135–145)
WBC # BLD AUTO: 5.06 10E3/UL (ref 4–11)

## 2025-08-25 ENCOUNTER — OFFICE VISIT (OUTPATIENT)
Dept: INTERNAL MEDICINE | Facility: CLINIC | Age: 60
End: 2025-08-25
Payer: COMMERCIAL

## 2025-08-25 ENCOUNTER — APPOINTMENT (OUTPATIENT)
Dept: LAB | Facility: CLINIC | Age: 60
End: 2025-08-25
Payer: COMMERCIAL

## 2025-08-25 VITALS
BODY MASS INDEX: 25.21 KG/M2 | OXYGEN SATURATION: 99 % | DIASTOLIC BLOOD PRESSURE: 78 MMHG | HEIGHT: 69 IN | WEIGHT: 170.2 LBS | TEMPERATURE: 98.1 F | SYSTOLIC BLOOD PRESSURE: 114 MMHG | RESPIRATION RATE: 14 BRPM

## 2025-08-25 DIAGNOSIS — Z00.00 ROUTINE GENERAL MEDICAL EXAMINATION AT A HEALTH CARE FACILITY: Primary | ICD-10-CM

## 2025-08-25 LAB
CHOLEST SERPL-MCNC: 221 MG/DL
FASTING STATUS PATIENT QL REPORTED: ABNORMAL
HDLC SERPL-MCNC: 38 MG/DL
LDLC SERPL CALC-MCNC: 145 MG/DL
NONHDLC SERPL-MCNC: 183 MG/DL
TRIGL SERPL-MCNC: 191 MG/DL
VIT D+METAB SERPL-MCNC: 28 NG/ML (ref 20–50)

## 2025-08-25 PROCEDURE — 36415 COLL VENOUS BLD VENIPUNCTURE: CPT | Performed by: PATHOLOGY

## 2025-08-25 PROCEDURE — 90471 IMMUNIZATION ADMIN: CPT | Performed by: PEDIATRICS

## 2025-08-25 PROCEDURE — 80061 LIPID PANEL: CPT | Performed by: PATHOLOGY

## 2025-08-25 PROCEDURE — 3074F SYST BP LT 130 MM HG: CPT | Performed by: PEDIATRICS

## 2025-08-25 PROCEDURE — 3050F LDL-C >= 130 MG/DL: CPT | Performed by: PATHOLOGY

## 2025-08-25 PROCEDURE — 82306 VITAMIN D 25 HYDROXY: CPT | Performed by: PEDIATRICS

## 2025-08-25 PROCEDURE — 1125F AMNT PAIN NOTED PAIN PRSNT: CPT | Performed by: PEDIATRICS

## 2025-08-25 PROCEDURE — 3078F DIAST BP <80 MM HG: CPT | Performed by: PEDIATRICS

## 2025-08-25 PROCEDURE — 99000 SPECIMEN HANDLING OFFICE-LAB: CPT | Performed by: PATHOLOGY

## 2025-08-25 PROCEDURE — 90746 HEPB VACCINE 3 DOSE ADULT IM: CPT | Performed by: PEDIATRICS

## 2025-08-25 PROCEDURE — 99396 PREV VISIT EST AGE 40-64: CPT | Mod: 25 | Performed by: PEDIATRICS

## 2025-08-25 RX ORDER — PSEUDOEPHED/ACETAMINOPH/DIPHEN 30MG-500MG
500-1000 TABLET ORAL EVERY 6 HOURS PRN
COMMUNITY
Start: 2025-08-15

## 2025-08-25 RX ORDER — ASPIRIN 81 MG/1
1 TABLET, COATED ORAL DAILY
COMMUNITY
Start: 2025-08-15

## 2025-08-25 SDOH — HEALTH STABILITY: PHYSICAL HEALTH: ON AVERAGE, HOW MANY DAYS PER WEEK DO YOU ENGAGE IN MODERATE TO STRENUOUS EXERCISE (LIKE A BRISK WALK)?: 0 DAYS

## 2025-08-25 SDOH — HEALTH STABILITY: PHYSICAL HEALTH: ON AVERAGE, HOW MANY MINUTES DO YOU ENGAGE IN EXERCISE AT THIS LEVEL?: 0 MIN

## 2025-08-25 ASSESSMENT — PAIN SCALES - GENERAL: PAINLEVEL_OUTOF10: MILD PAIN (3)

## (undated) RX ORDER — FENTANYL CITRATE 50 UG/ML
INJECTION, SOLUTION INTRAMUSCULAR; INTRAVENOUS
Status: DISPENSED
Start: 2025-06-03